# Patient Record
Sex: FEMALE | Race: BLACK OR AFRICAN AMERICAN | HISPANIC OR LATINO
[De-identification: names, ages, dates, MRNs, and addresses within clinical notes are randomized per-mention and may not be internally consistent; named-entity substitution may affect disease eponyms.]

---

## 2019-08-23 PROBLEM — Z00.00 ENCOUNTER FOR PREVENTIVE HEALTH EXAMINATION: Status: ACTIVE | Noted: 2019-08-23

## 2019-09-03 ENCOUNTER — APPOINTMENT (OUTPATIENT)
Dept: SURGERY | Facility: CLINIC | Age: 69
End: 2019-09-03
Payer: MEDICARE

## 2019-09-03 VITALS
HEART RATE: 63 BPM | WEIGHT: 179.19 LBS | TEMPERATURE: 97.8 F | HEIGHT: 55 IN | DIASTOLIC BLOOD PRESSURE: 102 MMHG | SYSTOLIC BLOOD PRESSURE: 194 MMHG | OXYGEN SATURATION: 97 % | BODY MASS INDEX: 41.47 KG/M2

## 2019-09-03 DIAGNOSIS — M19.90 UNSPECIFIED OSTEOARTHRITIS, UNSPECIFIED SITE: ICD-10-CM

## 2019-09-03 PROCEDURE — 99203 OFFICE O/P NEW LOW 30 MIN: CPT

## 2019-09-03 NOTE — END OF VISIT
[FreeTextEntry3] : All medical record entries made by the Scribe were at my, Dr. Bowen's direction and personally dictated by me on 09/03/2019  I have reviewed the chart and agree that the record accurately reflects my personal performance of the history, physical exam, assessment and plan. I have also personally directed, reviewed, and agreed with the chart.\par \par

## 2019-09-03 NOTE — HISTORY OF PRESENT ILLNESS
[de-identified] : 68 y/o F with BMI of 44, hx of HLD, PreDM, HTN, KARINE on CPAP, arthritis, Osteoporosis and thyroid disease and PSHx of 2x C-sections, Carpal tunnel surgery, foot surgery and percutaneous nephrolithotomy, presents here today for initial consultation of bariatric surgery. She states that she has attempted multiple diets and exercise regimen with no success. Patient is currently taking Atenolol, losartan, Calcium supplements, vitamin D, Levothyroxine, Atorvastatin, and receiving injections for osteoporosis every 6 months. Patient denies Tobacco or EtOH abuse. Currently not working.

## 2019-09-03 NOTE — ASSESSMENT
[FreeTextEntry1] : 68 y/o F with BMI of 44, hx of HLD, PreDM, HTN, KARINE on CPAP, arthritis, Osteoporosis and thyroid disease and PSHx of 2x C-sections, Carpal tunnel surgery, foot surgery and percutaneous nephrolithotomy, interested in bariatric surgery. Discuss with the patient on the available options in bariatric, including VSG vs Gastric bypass vs DS. Risk, benefits, and alternatives to the proposed procedure were discussed with the patient and all questions were answered to their satisfaction. Patient understands and will consider a procedure once she completes her work up. She will begin her work up process and follow up here as needed to help complete her work up.\par

## 2019-09-03 NOTE — ADDENDUM
[FreeTextEntry1] : Documented by Bee Herrera acting as a scribe for Dr. Mor Bowen on 09/03/2019\par

## 2019-09-17 ENCOUNTER — LABORATORY RESULT (OUTPATIENT)
Age: 69
End: 2019-09-17

## 2019-09-17 ENCOUNTER — APPOINTMENT (OUTPATIENT)
Dept: BARIATRICS | Facility: CLINIC | Age: 69
End: 2019-09-17

## 2019-09-17 VITALS — BODY MASS INDEX: 41.52 KG/M2 | WEIGHT: 179.38 LBS | HEIGHT: 55 IN

## 2019-09-26 DIAGNOSIS — A04.8 OTHER SPECIFIED BACTERIAL INTESTINAL INFECTIONS: ICD-10-CM

## 2019-09-26 RX ORDER — OMEPRAZOLE 20 MG/1
20 CAPSULE, DELAYED RELEASE ORAL TWICE DAILY
Qty: 28 | Refills: 2 | Status: ACTIVE | COMMUNITY
Start: 2019-09-26 | End: 1900-01-01

## 2019-09-26 RX ORDER — FLUCONAZOLE 150 MG/1
150 TABLET ORAL DAILY
Qty: 7 | Refills: 0 | Status: ACTIVE | COMMUNITY
Start: 2019-09-26 | End: 1900-01-01

## 2019-09-26 RX ORDER — AMOXICILLIN 500 MG/1
500 TABLET, FILM COATED ORAL TWICE DAILY
Qty: 56 | Refills: 0 | Status: ACTIVE | COMMUNITY
Start: 2019-09-26 | End: 1900-01-01

## 2019-09-26 RX ORDER — CLARITHROMYCIN 500 MG/1
500 TABLET, FILM COATED ORAL TWICE DAILY
Qty: 28 | Refills: 0 | Status: ACTIVE | COMMUNITY
Start: 2019-09-26 | End: 1900-01-01

## 2019-10-07 ENCOUNTER — APPOINTMENT (OUTPATIENT)
Dept: BARIATRICS | Facility: CLINIC | Age: 69
End: 2019-10-07
Payer: MEDICARE

## 2019-10-07 VITALS — WEIGHT: 174.38 LBS | HEIGHT: 55 IN | BODY MASS INDEX: 40.36 KG/M2

## 2019-10-07 PROCEDURE — 97802 MEDICAL NUTRITION INDIV IN: CPT

## 2019-10-10 ENCOUNTER — APPOINTMENT (OUTPATIENT)
Dept: ULTRASOUND IMAGING | Facility: HOSPITAL | Age: 69
End: 2019-10-10
Payer: MEDICARE

## 2019-10-10 ENCOUNTER — APPOINTMENT (OUTPATIENT)
Dept: RADIOLOGY | Facility: HOSPITAL | Age: 69
End: 2019-10-10
Payer: MEDICARE

## 2019-10-10 ENCOUNTER — OUTPATIENT (OUTPATIENT)
Dept: OUTPATIENT SERVICES | Facility: HOSPITAL | Age: 69
LOS: 1 days | End: 2019-10-10
Payer: MEDICARE

## 2019-10-10 PROCEDURE — 74241: CPT | Mod: 26

## 2019-10-10 PROCEDURE — 76700 US EXAM ABDOM COMPLETE: CPT | Mod: 26

## 2019-10-10 PROCEDURE — 71046 X-RAY EXAM CHEST 2 VIEWS: CPT | Mod: 26

## 2019-10-10 PROCEDURE — 76700 US EXAM ABDOM COMPLETE: CPT

## 2019-10-10 PROCEDURE — 71046 X-RAY EXAM CHEST 2 VIEWS: CPT

## 2019-10-10 PROCEDURE — 74241: CPT

## 2019-11-04 ENCOUNTER — LABORATORY RESULT (OUTPATIENT)
Age: 69
End: 2019-11-04

## 2019-11-04 VITALS — HEIGHT: 55 IN | WEIGHT: 176.25 LBS | BODY MASS INDEX: 40.79 KG/M2

## 2019-12-05 ENCOUNTER — APPOINTMENT (OUTPATIENT)
Dept: PULMONOLOGY | Facility: CLINIC | Age: 69
End: 2019-12-05
Payer: MEDICARE

## 2019-12-05 VITALS — BODY MASS INDEX: 40.38 KG/M2 | HEIGHT: 55 IN | WEIGHT: 174.5 LBS

## 2019-12-05 VITALS
DIASTOLIC BLOOD PRESSURE: 86 MMHG | OXYGEN SATURATION: 97 % | HEART RATE: 80 BPM | HEIGHT: 55 IN | SYSTOLIC BLOOD PRESSURE: 136 MMHG | TEMPERATURE: 98.3 F

## 2019-12-05 DIAGNOSIS — Z77.22 CONTACT WITH AND (SUSPECTED) EXPOSURE TO ENVIRONMENTAL TOBACCO SMOKE (ACUTE) (CHRONIC): ICD-10-CM

## 2019-12-05 DIAGNOSIS — Z01.811 ENCOUNTER FOR PREPROCEDURAL RESPIRATORY EXAMINATION: ICD-10-CM

## 2019-12-05 DIAGNOSIS — Z78.9 OTHER SPECIFIED HEALTH STATUS: ICD-10-CM

## 2019-12-05 DIAGNOSIS — Z83.3 FAMILY HISTORY OF DIABETES MELLITUS: ICD-10-CM

## 2019-12-05 DIAGNOSIS — Z80.1 FAMILY HISTORY OF MALIGNANT NEOPLASM OF TRACHEA, BRONCHUS AND LUNG: ICD-10-CM

## 2019-12-05 PROCEDURE — 99204 OFFICE O/P NEW MOD 45 MIN: CPT | Mod: 25

## 2019-12-05 PROCEDURE — 94060 EVALUATION OF WHEEZING: CPT

## 2019-12-05 PROCEDURE — 94727 GAS DIL/WSHOT DETER LNG VOL: CPT

## 2019-12-05 PROCEDURE — 94729 DIFFUSING CAPACITY: CPT

## 2019-12-05 RX ORDER — ATORVASTATIN CALCIUM 20 MG/1
20 TABLET, FILM COATED ORAL
Refills: 3 | Status: ACTIVE | COMMUNITY
Start: 2019-12-05

## 2019-12-05 RX ORDER — DENOSUMAB 60 MG/ML
60 INJECTION SUBCUTANEOUS
Refills: 0 | Status: ACTIVE | COMMUNITY
Start: 2019-12-05

## 2019-12-05 RX ORDER — LOSARTAN POTASSIUM 100 MG/1
100 TABLET, FILM COATED ORAL DAILY
Qty: 90 | Refills: 0 | Status: ACTIVE | COMMUNITY
Start: 2019-12-05

## 2019-12-05 RX ORDER — ATENOLOL 25 MG/1
25 TABLET ORAL DAILY
Refills: 0 | Status: ACTIVE | COMMUNITY
Start: 2019-12-05

## 2019-12-05 NOTE — HISTORY OF PRESENT ILLNESS
[Difficulty Breathing During Exertion] : denies dyspnea on exertion [Feelings Of Weakness On Exertion] : denies exercise intolerance [Cough] : denies coughing [Regional Soft Tissue Swelling Both Lower Extremities] : denies lower extremity edema [Fever] : denies fever [Wheezing] : denies wheezing [Chest Pain Or Discomfort] : denies chest pain [Wt Gain ___ Lbs] : recent [unfilled] ~Upound(s) weight gain [0  -  Nothing at all] : 0, nothing at all [Oxygen] : the patient uses no supplemental oxygen [FreeTextEntry1] :  number [id number] used during the visit.    \par \par 69 yr old female with PMH of KARINE with CPAP, HTN, HLD, pre DM and osteoporosis.  \par \par She has been using a CPAP machine in March 2019 (CPAP pressure 11 cm h20 with resmed air fit small nasal mask).  She is compliant with machine 4-6 hours a night.  She feels her day fatigue and sleeping has improved.  Denies snoring with mask on or walking up SOB.\par \par Denies SOB, cough, wheezing, edema, chest pain, palpitations.  \par \par

## 2019-12-05 NOTE — ASSESSMENT
[FreeTextEntry1] : ANDRZEJ BOJORQUEZ  is optimized for surgery. she  is to be extubated once fully awake and able to protect airway.  The patient is to be monitored in the recovery room. They might benefit for high flow oxygen or noninvasive ventilation to prevent or reverse atelectasis.  Patient is to be admitted to a monitored bed postoperatively.  Avoid oversedation.  ANDRZEJ is high risk for DVT and will require bimodal agents for DVT prophylaxis early mobilization is recommended. she is to use the incentive spirometry postoperative. \par \par - Reviewed CXR clear\par - PFT today normal spirometry without significant response to bronchodilator, normal TLC and normal DLCO.\par \par KARINE\par \par ANDRZEJ is compliant with the CPAP machine and tolerating.  I discussed the short and long term health effect of the obstructive seep apnea with the patient. These effects include, but not limited to, uncontrolled hypertension, CAD, arrhythmias, sudden death, CVA, and pulmonary hypertension. Continue to use the CPAP at least 4 plus hours nightly.  She is to use post op and follow with repeat sleep study post Surg / weight loss.  \par \par \par \par

## 2019-12-05 NOTE — PHYSICAL EXAM
[General Appearance - Well Developed] : well developed [Normal Appearance] : normal appearance [Well Groomed] : well groomed [General Appearance - Well Nourished] : well nourished [No Deformities] : no deformities [General Appearance - In No Acute Distress] : no acute distress [Normal Conjunctiva] : the conjunctiva exhibited no abnormalities [Neck Appearance] : the appearance of the neck was normal [Normal Oropharynx] : normal oropharynx [Eyelids - No Xanthelasma] : the eyelids demonstrated no xanthelasmas [Thyroid Diffuse Enlargement] : the thyroid was not enlarged [Jugular Venous Distention Increased] : there was no jugular-venous distention [Thyroid Nodule] : there were no palpable thyroid nodules [Neck Cervical Mass (___cm)] : no neck mass was observed [Heart Rate And Rhythm] : heart rate and rhythm were normal [Heart Sounds] : normal S1 and S2 [Murmurs] : no murmurs present [Respiration, Rhythm And Depth] : normal respiratory rhythm and effort [Auscultation Breath Sounds / Voice Sounds] : lungs were clear to auscultation bilaterally [Gait - Sufficient For Exercise Testing] : the gait was sufficient for exercise testing [Abnormal Walk] : normal gait [Exaggerated Use Of Accessory Muscles For Inspiration] : no accessory muscle use [Skin Turgor] : normal skin turgor [Skin Color & Pigmentation] : normal skin color and pigmentation [] : no rash

## 2019-12-16 ENCOUNTER — APPOINTMENT (OUTPATIENT)
Dept: BARIATRICS | Facility: CLINIC | Age: 69
End: 2019-12-16
Payer: MEDICARE

## 2019-12-16 VITALS — HEIGHT: 55 IN | BODY MASS INDEX: 40.07 KG/M2 | WEIGHT: 173.13 LBS

## 2019-12-16 DIAGNOSIS — E11.69 TYPE 2 DIABETES MELLITUS WITH OTHER SPECIFIED COMPLICATION: ICD-10-CM

## 2019-12-16 DIAGNOSIS — E78.2 TYPE 2 DIABETES MELLITUS WITH OTHER SPECIFIED COMPLICATION: ICD-10-CM

## 2019-12-16 PROCEDURE — 97803 MED NUTRITION INDIV SUBSEQ: CPT

## 2020-01-07 VITALS — WEIGHT: 173.13 LBS | HEIGHT: 55 IN | BODY MASS INDEX: 40.07 KG/M2

## 2020-02-04 ENCOUNTER — APPOINTMENT (OUTPATIENT)
Dept: SURGERY | Facility: CLINIC | Age: 70
End: 2020-02-04
Payer: MEDICARE

## 2020-02-04 VITALS
HEART RATE: 70 BPM | OXYGEN SATURATION: 97 % | DIASTOLIC BLOOD PRESSURE: 75 MMHG | WEIGHT: 171.25 LBS | TEMPERATURE: 97.5 F | HEIGHT: 55 IN | BODY MASS INDEX: 39.63 KG/M2 | SYSTOLIC BLOOD PRESSURE: 161 MMHG

## 2020-02-04 DIAGNOSIS — G47.33 OBSTRUCTIVE SLEEP APNEA (ADULT) (PEDIATRIC): ICD-10-CM

## 2020-02-04 DIAGNOSIS — R73.03 PREDIABETES.: ICD-10-CM

## 2020-02-04 PROCEDURE — 99214 OFFICE O/P EST MOD 30 MIN: CPT

## 2020-02-20 NOTE — HISTORY OF PRESENT ILLNESS
[de-identified] : 68 y/o F with BMI of 42, hx of HLD, PreDM, HTN, KARINE on CPAP, arthritis, Osteoporosis and thyroid disease and PSHx of 2x C-sections, Carpal tunnel surgery, foot surgery and percutaneous nephrolithotomy, presents here today for final bariatric consult. Patient completed all bariatric work up including 6 month weigh-ins and she is interested in a VSG operation. Patient was treated for h.pylori and her last test was negative. cardiologist cleared pt.

## 2020-02-20 NOTE — END OF VISIT
[FreeTextEntry3] : All medical record entries made by the Scribe were at my, Dr. Bowen's direction and personally dictated by me on 02/04/2020  I have reviewed the chart and agree that the record accurately reflects my personal performance of the history, physical exam, assessment and plan. I have also personally directed, reviewed, and agreed with the chart.\par

## 2020-02-20 NOTE — PLAN
[FreeTextEntry1] : See PCP for thyroid med adjustment and obtain medical clearance\par Schedule for VSG \par \par Patient completed all bariatric work-up including 6 month weigh-ins. PCP Medical clearance obtained. \par Scheduled for sleeve on 3/12/20

## 2020-02-20 NOTE — ASSESSMENT
[FreeTextEntry1] : 68 y/o F with BMI of 42, hx of HLD, PreDM, HTN, KARINE on CPAP, arthritis, Osteoporosis and thyroid disease and PSHx of 2x C-sections, Carpal tunnel surgery, foot surgery and percutaneous nephrolithotomy, presents here today for final bariatric consult. I discussed all pre-operative testing and imaging with the patient. I reviewed patient's blood work which demonstrated elevated TSH levels so I advised her to speak to her PCP, Dr. Blunt, about medication adjustment. I also reviewed patient's abdominal US 10/10/19 which demonstrates a complex kidney cyst which patient reports is known about and evaluated yearly with imaging by Dr. Blunt. I will refer patient to a cardiologist for cardiac clearance prior to the operation. Will schedule VSG operation pending thyroid medication adjustment and cardiac evaluation.

## 2020-02-27 ENCOUNTER — APPOINTMENT (OUTPATIENT)
Dept: HEART AND VASCULAR | Facility: CLINIC | Age: 70
End: 2020-02-27
Payer: MEDICARE

## 2020-02-27 ENCOUNTER — APPOINTMENT (OUTPATIENT)
Dept: BARIATRICS | Facility: CLINIC | Age: 70
End: 2020-02-27

## 2020-02-27 VITALS — HEIGHT: 55 IN | BODY MASS INDEX: 39.4 KG/M2 | WEIGHT: 170.25 LBS

## 2020-02-27 VITALS
OXYGEN SATURATION: 95 % | TEMPERATURE: 98 F | HEART RATE: 65 BPM | RESPIRATION RATE: 14 BRPM | BODY MASS INDEX: 39.34 KG/M2 | HEIGHT: 55 IN | WEIGHT: 170 LBS

## 2020-02-27 DIAGNOSIS — Z01.810 ENCOUNTER FOR PREPROCEDURAL CARDIOVASCULAR EXAMINATION: ICD-10-CM

## 2020-02-27 DIAGNOSIS — E66.01 MORBID (SEVERE) OBESITY DUE TO EXCESS CALORIES: ICD-10-CM

## 2020-02-27 DIAGNOSIS — I10 ESSENTIAL (PRIMARY) HYPERTENSION: ICD-10-CM

## 2020-02-27 PROCEDURE — 93000 ELECTROCARDIOGRAM COMPLETE: CPT

## 2020-02-27 PROCEDURE — 99204 OFFICE O/P NEW MOD 45 MIN: CPT

## 2020-02-27 RX ORDER — LEVOTHYROXINE SODIUM 0.03 MG/1
25 TABLET ORAL
Qty: 18 | Refills: 0 | Status: ACTIVE | COMMUNITY
Start: 2019-09-24

## 2020-02-27 RX ORDER — HYDROCHLOROTHIAZIDE 25 MG/1
25 TABLET ORAL
Qty: 90 | Refills: 0 | Status: ACTIVE | COMMUNITY
Start: 2020-02-17

## 2020-02-27 RX ORDER — ERGOCALCIFEROL 1.25 MG/1
1.25 MG CAPSULE, LIQUID FILLED ORAL
Qty: 4 | Refills: 0 | Status: ACTIVE | COMMUNITY
Start: 2019-12-03

## 2020-02-27 NOTE — HISTORY OF PRESENT ILLNESS
[FreeTextEntry1] : for cardiac evaluation prior to bariatric surgery\par good functional capacity\par lexiscan 11/19 at [facility] without ischemia\par echo 11/19 with normal LVEF, no significant valve disease\par no new interm cardiac symptoms

## 2020-02-27 NOTE — PHYSICAL EXAM
[Normal Appearance] : normal appearance [General Appearance - Well Developed] : well developed [General Appearance - Well Nourished] : well nourished [Well Groomed] : well groomed [No Deformities] : no deformities [Normal Conjunctiva] : the conjunctiva exhibited no abnormalities [General Appearance - In No Acute Distress] : no acute distress [Normal Oral Mucosa] : normal oral mucosa [No Oral Pallor] : no oral pallor [Eyelids - No Xanthelasma] : the eyelids demonstrated no xanthelasmas [No Oral Cyanosis] : no oral cyanosis [Normal Jugular Venous A Waves Present] : normal jugular venous A waves present [Normal Jugular Venous V Waves Present] : normal jugular venous V waves present [No Jugular Venous Anguiano A Waves] : no jugular venous anguiano A waves [Auscultation Breath Sounds / Voice Sounds] : lungs were clear to auscultation bilaterally [Exaggerated Use Of Accessory Muscles For Inspiration] : no accessory muscle use [Respiration, Rhythm And Depth] : normal respiratory rhythm and effort [Heart Rate And Rhythm] : heart rate and rhythm were normal [Heart Sounds] : normal S1 and S2 [Murmurs] : no murmurs present [Abdomen Soft] : soft [Abdomen Tenderness] : non-tender [Abdomen Mass (___ Cm)] : no abdominal mass palpated [Cyanosis, Localized] : no localized cyanosis [Gait - Sufficient For Exercise Testing] : the gait was sufficient for exercise testing [Nail Clubbing] : no clubbing of the fingernails [] : no ischemic changes [Oriented To Time, Place, And Person] : oriented to person, place, and time [Petechial Hemorrhages (___cm)] : no petechial hemorrhages [Mood] : the mood was normal [Affect] : the affect was normal [No Anxiety] : not feeling anxious

## 2020-02-27 NOTE — REVIEW OF SYSTEMS
[Joint Pain] : joint pain [see HPI] : see HPI [Joint Stiffness] : joint stiffness [Negative] : Heme/Lymph

## 2020-02-27 NOTE — DISCUSSION/SUMMARY
[FreeTextEntry1] : stable cardiac exam, recent negative non invasive cardiac evaluation, no cardiac contra indication to planned procedure

## 2020-06-01 ENCOUNTER — OUTPATIENT (OUTPATIENT)
Dept: OUTPATIENT SERVICES | Facility: HOSPITAL | Age: 70
LOS: 1 days | End: 2020-06-01
Payer: MEDICARE

## 2020-06-01 DIAGNOSIS — Z98.890 OTHER SPECIFIED POSTPROCEDURAL STATES: Chronic | ICD-10-CM

## 2020-06-01 DIAGNOSIS — Z98.891 HISTORY OF UTERINE SCAR FROM PREVIOUS SURGERY: Chronic | ICD-10-CM

## 2020-06-01 DIAGNOSIS — Z98.51 TUBAL LIGATION STATUS: Chronic | ICD-10-CM

## 2020-06-03 ENCOUNTER — OUTPATIENT (OUTPATIENT)
Dept: OUTPATIENT SERVICES | Facility: HOSPITAL | Age: 70
LOS: 1 days | End: 2020-06-03
Payer: MEDICARE

## 2020-06-03 DIAGNOSIS — Z98.890 OTHER SPECIFIED POSTPROCEDURAL STATES: Chronic | ICD-10-CM

## 2020-06-03 DIAGNOSIS — Z98.891 HISTORY OF UTERINE SCAR FROM PREVIOUS SURGERY: Chronic | ICD-10-CM

## 2020-06-03 DIAGNOSIS — Z01.818 ENCOUNTER FOR OTHER PREPROCEDURAL EXAMINATION: ICD-10-CM

## 2020-06-03 DIAGNOSIS — Z98.51 TUBAL LIGATION STATUS: Chronic | ICD-10-CM

## 2020-06-03 PROBLEM — I10 ESSENTIAL (PRIMARY) HYPERTENSION: Chronic | Status: ACTIVE | Noted: 2020-03-11

## 2020-06-03 PROBLEM — G47.30 SLEEP APNEA, UNSPECIFIED: Chronic | Status: ACTIVE | Noted: 2020-03-11

## 2020-06-03 PROBLEM — E78.5 HYPERLIPIDEMIA, UNSPECIFIED: Chronic | Status: ACTIVE | Noted: 2020-03-11

## 2020-06-03 PROBLEM — E03.9 HYPOTHYROIDISM, UNSPECIFIED: Chronic | Status: ACTIVE | Noted: 2020-03-11

## 2020-06-03 PROBLEM — E66.01 MORBID (SEVERE) OBESITY DUE TO EXCESS CALORIES: Chronic | Status: ACTIVE | Noted: 2020-03-11

## 2020-06-03 PROBLEM — M81.0 AGE-RELATED OSTEOPOROSIS WITHOUT CURRENT PATHOLOGICAL FRACTURE: Chronic | Status: ACTIVE | Noted: 2020-03-11

## 2020-06-03 PROBLEM — N20.0 CALCULUS OF KIDNEY: Chronic | Status: ACTIVE | Noted: 2020-03-11

## 2020-06-03 LAB
ALBUMIN SERPL ELPH-MCNC: 4.7 G/DL — SIGNIFICANT CHANGE UP (ref 3.3–5)
ALP SERPL-CCNC: 58 U/L — SIGNIFICANT CHANGE UP (ref 40–120)
ALT FLD-CCNC: 18 U/L — SIGNIFICANT CHANGE UP (ref 10–45)
ANION GAP SERPL CALC-SCNC: 13 MMOL/L — SIGNIFICANT CHANGE UP (ref 5–17)
APPEARANCE UR: CLEAR — SIGNIFICANT CHANGE UP
APTT BLD: 31.8 SEC — SIGNIFICANT CHANGE UP (ref 27.5–36.3)
AST SERPL-CCNC: 32 U/L — SIGNIFICANT CHANGE UP (ref 10–40)
BACTERIA # UR AUTO: PRESENT /HPF
BASOPHILS # BLD AUTO: 0.04 K/UL — SIGNIFICANT CHANGE UP (ref 0–0.2)
BASOPHILS NFR BLD AUTO: 0.6 % — SIGNIFICANT CHANGE UP (ref 0–2)
BILIRUB SERPL-MCNC: 1.6 MG/DL — HIGH (ref 0.2–1.2)
BILIRUB UR-MCNC: NEGATIVE — SIGNIFICANT CHANGE UP
BUN SERPL-MCNC: 14 MG/DL — SIGNIFICANT CHANGE UP (ref 7–23)
CALCIUM SERPL-MCNC: 10.2 MG/DL — SIGNIFICANT CHANGE UP (ref 8.4–10.5)
CHLORIDE SERPL-SCNC: 99 MMOL/L — SIGNIFICANT CHANGE UP (ref 96–108)
CO2 SERPL-SCNC: 33 MMOL/L — HIGH (ref 22–31)
COLOR SPEC: YELLOW — SIGNIFICANT CHANGE UP
CREAT SERPL-MCNC: 0.66 MG/DL — SIGNIFICANT CHANGE UP (ref 0.5–1.3)
DIFF PNL FLD: ABNORMAL
EOSINOPHIL # BLD AUTO: 0.13 K/UL — SIGNIFICANT CHANGE UP (ref 0–0.5)
EOSINOPHIL NFR BLD AUTO: 1.9 % — SIGNIFICANT CHANGE UP (ref 0–6)
EPI CELLS # UR: SIGNIFICANT CHANGE UP /HPF (ref 0–5)
GLUCOSE SERPL-MCNC: 113 MG/DL — HIGH (ref 70–99)
GLUCOSE UR QL: NEGATIVE — SIGNIFICANT CHANGE UP
HCT VFR BLD CALC: 44.2 % — SIGNIFICANT CHANGE UP (ref 34.5–45)
HGB BLD-MCNC: 14.2 G/DL — SIGNIFICANT CHANGE UP (ref 11.5–15.5)
IMM GRANULOCYTES NFR BLD AUTO: 0.1 % — SIGNIFICANT CHANGE UP (ref 0–1.5)
INR BLD: 1.18 — HIGH (ref 0.88–1.16)
KETONES UR-MCNC: NEGATIVE — SIGNIFICANT CHANGE UP
LEUKOCYTE ESTERASE UR-ACNC: ABNORMAL
LYMPHOCYTES # BLD AUTO: 2.24 K/UL — SIGNIFICANT CHANGE UP (ref 1–3.3)
LYMPHOCYTES # BLD AUTO: 32.6 % — SIGNIFICANT CHANGE UP (ref 13–44)
MCHC RBC-ENTMCNC: 31.9 PG — SIGNIFICANT CHANGE UP (ref 27–34)
MCHC RBC-ENTMCNC: 32.1 GM/DL — SIGNIFICANT CHANGE UP (ref 32–36)
MCV RBC AUTO: 99.3 FL — SIGNIFICANT CHANGE UP (ref 80–100)
MONOCYTES # BLD AUTO: 0.57 K/UL — SIGNIFICANT CHANGE UP (ref 0–0.9)
MONOCYTES NFR BLD AUTO: 8.3 % — SIGNIFICANT CHANGE UP (ref 2–14)
NEUTROPHILS # BLD AUTO: 3.89 K/UL — SIGNIFICANT CHANGE UP (ref 1.8–7.4)
NEUTROPHILS NFR BLD AUTO: 56.5 % — SIGNIFICANT CHANGE UP (ref 43–77)
NITRITE UR-MCNC: NEGATIVE — SIGNIFICANT CHANGE UP
NRBC # BLD: 0 /100 WBCS — SIGNIFICANT CHANGE UP (ref 0–0)
PH UR: 7 — SIGNIFICANT CHANGE UP (ref 5–8)
PLATELET # BLD AUTO: 346 K/UL — SIGNIFICANT CHANGE UP (ref 150–400)
POTASSIUM SERPL-MCNC: 4.3 MMOL/L — SIGNIFICANT CHANGE UP (ref 3.5–5.3)
POTASSIUM SERPL-SCNC: 4.3 MMOL/L — SIGNIFICANT CHANGE UP (ref 3.5–5.3)
PROT SERPL-MCNC: 7.5 G/DL — SIGNIFICANT CHANGE UP (ref 6–8.3)
PROT UR-MCNC: ABNORMAL MG/DL
PROTHROM AB SERPL-ACNC: 13.5 SEC — HIGH (ref 10–12.9)
RBC # BLD: 4.45 M/UL — SIGNIFICANT CHANGE UP (ref 3.8–5.2)
RBC # FLD: 13.2 % — SIGNIFICANT CHANGE UP (ref 10.3–14.5)
RBC CASTS # UR COMP ASSIST: ABNORMAL /HPF
SODIUM SERPL-SCNC: 145 MMOL/L — SIGNIFICANT CHANGE UP (ref 135–145)
SP GR SPEC: <=1.005 — SIGNIFICANT CHANGE UP (ref 1–1.03)
UROBILINOGEN FLD QL: 0.2 E.U./DL — SIGNIFICANT CHANGE UP
WBC # BLD: 6.88 K/UL — SIGNIFICANT CHANGE UP (ref 3.8–10.5)
WBC # FLD AUTO: 6.88 K/UL — SIGNIFICANT CHANGE UP (ref 3.8–10.5)
WBC UR QL: ABNORMAL /HPF

## 2020-06-03 PROCEDURE — 85730 THROMBOPLASTIN TIME PARTIAL: CPT

## 2020-06-03 PROCEDURE — 85025 COMPLETE CBC W/AUTO DIFF WBC: CPT

## 2020-06-03 PROCEDURE — 93005 ELECTROCARDIOGRAM TRACING: CPT

## 2020-06-03 PROCEDURE — 80053 COMPREHEN METABOLIC PANEL: CPT

## 2020-06-03 PROCEDURE — 81001 URINALYSIS AUTO W/SCOPE: CPT

## 2020-06-03 PROCEDURE — 85610 PROTHROMBIN TIME: CPT

## 2020-06-03 PROCEDURE — 93010 ELECTROCARDIOGRAM REPORT: CPT

## 2020-06-04 ENCOUNTER — APPOINTMENT (OUTPATIENT)
Dept: BARIATRICS | Facility: CLINIC | Age: 70
End: 2020-06-04
Payer: MEDICARE

## 2020-06-04 PROCEDURE — 97803 MED NUTRITION INDIV SUBSEQ: CPT

## 2020-06-04 PROCEDURE — 98968 PH1 ASSMT&MGMT NQHP 21-30: CPT

## 2020-06-05 ENCOUNTER — LABORATORY RESULT (OUTPATIENT)
Age: 70
End: 2020-06-05

## 2020-06-07 ENCOUNTER — TRANSCRIPTION ENCOUNTER (OUTPATIENT)
Age: 70
End: 2020-06-07

## 2020-06-08 ENCOUNTER — INPATIENT (INPATIENT)
Facility: HOSPITAL | Age: 70
LOS: 1 days | Discharge: HOME | DRG: 620 | End: 2020-06-10
Attending: SURGERY | Admitting: SURGERY
Payer: MEDICARE

## 2020-06-08 ENCOUNTER — APPOINTMENT (OUTPATIENT)
Dept: SURGERY | Facility: HOSPITAL | Age: 70
End: 2020-06-08

## 2020-06-08 ENCOUNTER — RESULT REVIEW (OUTPATIENT)
Age: 70
End: 2020-06-08

## 2020-06-08 VITALS
DIASTOLIC BLOOD PRESSURE: 80 MMHG | TEMPERATURE: 98 F | HEART RATE: 61 BPM | HEIGHT: 55 IN | OXYGEN SATURATION: 98 % | WEIGHT: 167.77 LBS | RESPIRATION RATE: 16 BRPM | SYSTOLIC BLOOD PRESSURE: 148 MMHG

## 2020-06-08 DIAGNOSIS — Z98.51 TUBAL LIGATION STATUS: Chronic | ICD-10-CM

## 2020-06-08 DIAGNOSIS — Z98.890 OTHER SPECIFIED POSTPROCEDURAL STATES: Chronic | ICD-10-CM

## 2020-06-08 DIAGNOSIS — Z98.891 HISTORY OF UTERINE SCAR FROM PREVIOUS SURGERY: Chronic | ICD-10-CM

## 2020-06-08 LAB
GLUCOSE BLDC GLUCOMTR-MCNC: 135 MG/DL — HIGH (ref 70–99)
GLUCOSE BLDC GLUCOMTR-MCNC: 137 MG/DL — HIGH (ref 70–99)
HCT VFR BLD CALC: 38.8 % — SIGNIFICANT CHANGE UP (ref 34.5–45)
HGB BLD-MCNC: 12.5 G/DL — SIGNIFICANT CHANGE UP (ref 11.5–15.5)
MCHC RBC-ENTMCNC: 32 PG — SIGNIFICANT CHANGE UP (ref 27–34)
MCHC RBC-ENTMCNC: 32.2 GM/DL — SIGNIFICANT CHANGE UP (ref 32–36)
MCV RBC AUTO: 99.2 FL — SIGNIFICANT CHANGE UP (ref 80–100)
NRBC # BLD: 0 /100 WBCS — SIGNIFICANT CHANGE UP (ref 0–0)
PLATELET # BLD AUTO: 315 K/UL — SIGNIFICANT CHANGE UP (ref 150–400)
RBC # BLD: 3.91 M/UL — SIGNIFICANT CHANGE UP (ref 3.8–5.2)
RBC # FLD: 13.5 % — SIGNIFICANT CHANGE UP (ref 10.3–14.5)
WBC # BLD: 11.03 K/UL — HIGH (ref 3.8–10.5)
WBC # FLD AUTO: 11.03 K/UL — HIGH (ref 3.8–10.5)

## 2020-06-08 PROCEDURE — 88341 IMHCHEM/IMCYTCHM EA ADD ANTB: CPT | Mod: 26

## 2020-06-08 PROCEDURE — 88307 TISSUE EXAM BY PATHOLOGIST: CPT | Mod: 26

## 2020-06-08 PROCEDURE — 43775 LAP SLEEVE GASTRECTOMY: CPT | Mod: GC

## 2020-06-08 PROCEDURE — 88342 IMHCHEM/IMCYTCHM 1ST ANTB: CPT | Mod: 26

## 2020-06-08 PROCEDURE — 88311 DECALCIFY TISSUE: CPT | Mod: 26

## 2020-06-08 RX ORDER — ACETAMINOPHEN 500 MG
1000 TABLET ORAL ONCE
Refills: 0 | Status: DISCONTINUED | OUTPATIENT
Start: 2020-06-08 | End: 2020-06-08

## 2020-06-08 RX ORDER — HYDROMORPHONE HYDROCHLORIDE 2 MG/ML
0.5 INJECTION INTRAMUSCULAR; INTRAVENOUS; SUBCUTANEOUS
Refills: 0 | Status: DISCONTINUED | OUTPATIENT
Start: 2020-06-08 | End: 2020-06-08

## 2020-06-08 RX ORDER — ACETAMINOPHEN 500 MG
1000 TABLET ORAL ONCE
Refills: 0 | Status: COMPLETED | OUTPATIENT
Start: 2020-06-08 | End: 2020-06-08

## 2020-06-08 RX ORDER — ONDANSETRON 8 MG/1
4 TABLET, FILM COATED ORAL EVERY 6 HOURS
Refills: 0 | Status: DISCONTINUED | OUTPATIENT
Start: 2020-06-08 | End: 2020-06-10

## 2020-06-08 RX ORDER — ENOXAPARIN SODIUM 100 MG/ML
60 INJECTION SUBCUTANEOUS EVERY 12 HOURS
Refills: 0 | Status: DISCONTINUED | OUTPATIENT
Start: 2020-06-08 | End: 2020-06-10

## 2020-06-08 RX ORDER — DEXTROSE 50 % IN WATER 50 %
25 SYRINGE (ML) INTRAVENOUS ONCE
Refills: 0 | Status: DISCONTINUED | OUTPATIENT
Start: 2020-06-08 | End: 2020-06-10

## 2020-06-08 RX ORDER — SODIUM CHLORIDE 9 MG/ML
1000 INJECTION, SOLUTION INTRAVENOUS
Refills: 0 | Status: DISCONTINUED | OUTPATIENT
Start: 2020-06-08 | End: 2020-06-09

## 2020-06-08 RX ORDER — GABAPENTIN 400 MG/1
300 CAPSULE ORAL ONCE
Refills: 0 | Status: COMPLETED | OUTPATIENT
Start: 2020-06-08 | End: 2020-06-08

## 2020-06-08 RX ORDER — ENOXAPARIN SODIUM 100 MG/ML
40 INJECTION SUBCUTANEOUS EVERY 12 HOURS
Refills: 0 | Status: DISCONTINUED | OUTPATIENT
Start: 2020-06-08 | End: 2020-06-08

## 2020-06-08 RX ORDER — ACETAMINOPHEN 500 MG
1000 TABLET ORAL EVERY 6 HOURS
Refills: 0 | Status: COMPLETED | OUTPATIENT
Start: 2020-06-08 | End: 2020-06-08

## 2020-06-08 RX ORDER — SCOPALAMINE 1 MG/3D
1 PATCH, EXTENDED RELEASE TRANSDERMAL ONCE
Refills: 0 | Status: COMPLETED | OUTPATIENT
Start: 2020-06-08 | End: 2020-06-08

## 2020-06-08 RX ORDER — PANTOPRAZOLE SODIUM 20 MG/1
40 TABLET, DELAYED RELEASE ORAL DAILY
Refills: 0 | Status: DISCONTINUED | OUTPATIENT
Start: 2020-06-08 | End: 2020-06-10

## 2020-06-08 RX ORDER — HYDRALAZINE HCL 50 MG
5 TABLET ORAL ONCE
Refills: 0 | Status: COMPLETED | OUTPATIENT
Start: 2020-06-08 | End: 2020-06-08

## 2020-06-08 RX ORDER — INSULIN LISPRO 100/ML
VIAL (ML) SUBCUTANEOUS
Refills: 0 | Status: DISCONTINUED | OUTPATIENT
Start: 2020-06-08 | End: 2020-06-10

## 2020-06-08 RX ORDER — ACETAMINOPHEN 500 MG
1000 TABLET ORAL ONCE
Refills: 0 | Status: COMPLETED | OUTPATIENT
Start: 2020-06-09 | End: 2020-06-09

## 2020-06-08 RX ORDER — SODIUM CHLORIDE 9 MG/ML
1000 INJECTION, SOLUTION INTRAVENOUS
Refills: 0 | Status: DISCONTINUED | OUTPATIENT
Start: 2020-06-08 | End: 2020-06-10

## 2020-06-08 RX ORDER — DEXTROSE 50 % IN WATER 50 %
15 SYRINGE (ML) INTRAVENOUS ONCE
Refills: 0 | Status: DISCONTINUED | OUTPATIENT
Start: 2020-06-08 | End: 2020-06-10

## 2020-06-08 RX ORDER — ENOXAPARIN SODIUM 100 MG/ML
40 INJECTION SUBCUTANEOUS ONCE
Refills: 0 | Status: COMPLETED | OUTPATIENT
Start: 2020-06-08 | End: 2020-06-08

## 2020-06-08 RX ORDER — LEVOTHYROXINE SODIUM 125 MCG
20 TABLET ORAL AT BEDTIME
Refills: 0 | Status: DISCONTINUED | OUTPATIENT
Start: 2020-06-08 | End: 2020-06-10

## 2020-06-08 RX ORDER — KETOROLAC TROMETHAMINE 30 MG/ML
15 SYRINGE (ML) INJECTION EVERY 6 HOURS
Refills: 0 | Status: DISCONTINUED | OUTPATIENT
Start: 2020-06-08 | End: 2020-06-08

## 2020-06-08 RX ORDER — KETOROLAC TROMETHAMINE 30 MG/ML
15 SYRINGE (ML) INJECTION EVERY 6 HOURS
Refills: 0 | Status: DISCONTINUED | OUTPATIENT
Start: 2020-06-08 | End: 2020-06-10

## 2020-06-08 RX ORDER — GLUCAGON INJECTION, SOLUTION 0.5 MG/.1ML
1 INJECTION, SOLUTION SUBCUTANEOUS ONCE
Refills: 0 | Status: DISCONTINUED | OUTPATIENT
Start: 2020-06-08 | End: 2020-06-10

## 2020-06-08 RX ORDER — DEXTROSE 50 % IN WATER 50 %
12.5 SYRINGE (ML) INTRAVENOUS ONCE
Refills: 0 | Status: DISCONTINUED | OUTPATIENT
Start: 2020-06-08 | End: 2020-06-10

## 2020-06-08 RX ADMIN — GABAPENTIN 300 MILLIGRAM(S): 400 CAPSULE ORAL at 09:48

## 2020-06-08 RX ADMIN — ENOXAPARIN SODIUM 60 MILLIGRAM(S): 100 INJECTION SUBCUTANEOUS at 21:52

## 2020-06-08 RX ADMIN — PANTOPRAZOLE SODIUM 40 MILLIGRAM(S): 20 TABLET, DELAYED RELEASE ORAL at 15:04

## 2020-06-08 RX ADMIN — Medication 15 MILLIGRAM(S): at 23:41

## 2020-06-08 RX ADMIN — Medication 20 MICROGRAM(S): at 21:52

## 2020-06-08 RX ADMIN — SCOPALAMINE 1 PATCH: 1 PATCH, EXTENDED RELEASE TRANSDERMAL at 09:48

## 2020-06-08 RX ADMIN — Medication 5 MILLIGRAM(S): at 14:34

## 2020-06-08 RX ADMIN — SCOPALAMINE 1 PATCH: 1 PATCH, EXTENDED RELEASE TRANSDERMAL at 18:01

## 2020-06-08 RX ADMIN — Medication 400 MILLIGRAM(S): at 15:05

## 2020-06-08 RX ADMIN — SODIUM CHLORIDE 150 MILLILITER(S): 9 INJECTION, SOLUTION INTRAVENOUS at 17:38

## 2020-06-08 RX ADMIN — Medication 15 MILLIGRAM(S): at 17:13

## 2020-06-08 RX ADMIN — Medication 5 MILLIGRAM(S): at 14:05

## 2020-06-08 RX ADMIN — Medication 1000 MILLIGRAM(S): at 09:48

## 2020-06-08 RX ADMIN — ENOXAPARIN SODIUM 40 MILLIGRAM(S): 100 INJECTION SUBCUTANEOUS at 09:48

## 2020-06-08 RX ADMIN — Medication 400 MILLIGRAM(S): at 21:52

## 2020-06-08 NOTE — H&P ADULT - HISTORY OF PRESENT ILLNESS
69F with PMH of HTN, HLD, PreDM, Morbid obesity (BMI 42), KARINE on CPAP, OA, thyroid disease, PSH of C-sections x2, carpal tunnel surgery, foot surgery, percutaneous nephrolithotomy admitted for elective LSG. Pt reports feeling well today, denies flu-like symptoms, COVID negative. 69F with PMH of HTN, HLD, PreDM, Morbid obesity (BMI 42), KARINE on CPAP, OA, hypothyroidism, PSH of C-sections x2, carpal tunnel surgery, foot surgery, percutaneous nephrolithotomy admitted for elective LSG. Pt reports feeling well today, denies flu-like symptoms, COVID negative. Pre-op Hb 14, Cr: 0.6. 69F with PMH of HTN, HLD, PreDM, Morbid obesity (BMI 51), KARINE on CPAP, OA, hypothyroidism, PSH of C-sections x2, carpal tunnel surgery, foot surgery, percutaneous nephrolithotomy admitted for elective LSG. Pt reports feeling well today, denies flu-like symptoms, COVID negative. Pre-op Hb 14, Cr: 0.6.

## 2020-06-08 NOTE — H&P ADULT - NSICDXPASTSURGICALHX_GEN_ALL_CORE_FT
PAST SURGICAL HISTORY:  H/O abdominoplasty     H/O foot surgery right x2    H/O tubal ligation     H/O:  section x2    History of surgery hand tenodesis and carpal tunnel    History of surgery right nephrostomy

## 2020-06-08 NOTE — PACU DISCHARGE NOTE - COMMENTS
pt had an uneventful stay in pacu VSS awake alert on a bed with 90 degree angle, breathing at 99% on room air has 4 derma bond lap sites to abd, gave report to Jed Stovall going to room 9613 via bed. no neuro deficit noted. I spoke with daughter Niesha and gave an up date. security was called to bring up her phone.

## 2020-06-08 NOTE — H&P ADULT - NSICDXPASTMEDICALHX_GEN_ALL_CORE_FT
PAST MEDICAL HISTORY:  HLD (hyperlipidemia)     HTN (hypertension)     Hypothyroid     Morbid obesity     Nephrolithiasis     Osteoporosis     Sleep apnea

## 2020-06-08 NOTE — PROGRESS NOTE ADULT - ASSESSMENT
69F with PMH of HTN, HLD, PreDM, Morbid obesity (BMI 51), KARINE on CPAP, OA, hypothyroidism, PSH of C-sections x2, carpal tunnel surgery, foot surgery, percutaneous nephrolithotomy s/p  lap sleeve gastrectomy 2cm solid mass on the superior greater 6/8  -Pain/nausea control  -NPO, IVF  -Protonix  -CBC 6hrs post-op   -Lovenox DVT ppx   -SCDs, OOB/A, IS  -AM labs  -Nutritional consult in AM

## 2020-06-08 NOTE — H&P ADULT - NSHPPHYSICALEXAM_GEN_ALL_CORE
ICU Vital Signs Last 24 Hrs  T(C): 36.4 (08 Jun 2020 08:37), Max: 36.4 (08 Jun 2020 08:37)  T(F): 97.6 (08 Jun 2020 08:37), Max: 97.6 (08 Jun 2020 08:37)  HR: 61 (08 Jun 2020 08:37) (61 - 61)  BP: 148/80 (08 Jun 2020 08:37) (148/80 - 148/80)  BP(mean): --  ABP: --  ABP(mean): --  RR: 16 (08 Jun 2020 08:37) (16 - 16)  SpO2: 98% (08 Jun 2020 08:37) (98% - 98%)    GEN: NAD, awake, eyes open spontaneously  HEENT: NCAT, MMM, Trachea midline, normal conjunctiva, perrl  CHEST/LUNGS: Nonlaboured breathing, CTAB, bilateral breath sounds  CARDIAC: RRR, no m/r/g  ABDOMEN: Soft, Nontender, Nondistended, No rebound/guarding  MSK: No oedema, no gross deformity of extremities  SKIN: No rashes, no petechiae, no vesicles  NEURO: CN grossly intact, normal coordination, no focal motor or sensory deficits  PSYCH: Alert, appropriate, cooperative, with capacity and insight

## 2020-06-08 NOTE — H&P ADULT - ASSESSMENT
69F with PMH of HTN, HLD, PreDM, Morbid obesity (BMI 42), KARINE on CPAP, OA, thyroid disease, PSH of C-sections x2, carpal tunnel surgery, foot surgery, percutaneous nephrolithotomy admitted for elective LSG.    Plan  NPO, IVF  OR for LSG  Pain control and zofran post-op  DVT PPX, HSQ, SCD 69F with PMH of HTN, HLD, PreDM, Morbid obesity (BMI 42), KARINE on CPAP, OA, hypothyroidism, PSH of C-sections x2, carpal tunnel surgery, foot surgery, percutaneous nephrolithotomy admitted for elective LSG.    Plan  NPO, IVF  OR for LSG  Pain control and zofran post-op  Post-op bariatric orders   DVT PPX, LVX, SCD

## 2020-06-08 NOTE — PROGRESS NOTE ADULT - SUBJECTIVE AND OBJECTIVE BOX
POST-OPERATIVE NOTE    Procedure: Laparoscopic sleeve gastrectomy     Diagnosis/Indication: Obesity     Surgeon: Dr. Mcclellan    S: Pt has no complaints. Denies CP, SOB, PRASAD, calf tenderness. Pain controlled with medication.    O:  T(C): 36.7 (06-08-20 @ 17:12), Max: 36.7 (06-08-20 @ 17:12)  T(F): 98 (06-08-20 @ 17:12), Max: 98 (06-08-20 @ 17:12)  HR: 71 (06-08-20 @ 17:12) (62 - 82)  BP: 120/66 (06-08-20 @ 17:12) (117/56 - 189/88)  RR: 17 (06-08-20 @ 17:12) (16 - 25)  SpO2: 96% (06-08-20 @ 17:12) (96% - 100%)  Wt(kg): --            Gen: NAD, resting comfortably in bed  C/V: NSR  Pulm: Nonlabored breathing, no respiratory distress  Abd: soft, non-distended , TTP around incision site , incision clean dry and intact  Extrem: WWP, no calf edema, SCDs in place

## 2020-06-08 NOTE — BRIEF OPERATIVE NOTE - OPERATION/FINDINGS
Veress entry. Stomach divided along 32Fr Bougie edge using 6 staple loads (45mm x 2, 60mm x 4). Hemostasis achieved. Fascia at 12mm post site closed with zero vicryl. Port sites x 4 closed w/ 4-0 Monocryl sutures & surgical glue. Veress needle leak test on stomach specimen negative for leak. Veress entry. 2cm solid mass on the superior greater curvature of the stomach identified. Stomach divided along 32Fr Bougie edge using 6 staple loads (45mm x 2, 60mm x 4). Hemostasis achieved. Fascia at 12mm post site closed with zero vicryl. Port sites x 4 closed w/ 4-0 Monocryl sutures & surgical glue. Veress needle leak test on stomach specimen negative for leak.

## 2020-06-09 ENCOUNTER — TRANSCRIPTION ENCOUNTER (OUTPATIENT)
Age: 70
End: 2020-06-09

## 2020-06-09 LAB
A1C WITH ESTIMATED AVERAGE GLUCOSE RESULT: 6 % — HIGH (ref 4–5.6)
ANION GAP SERPL CALC-SCNC: 16 MMOL/L — SIGNIFICANT CHANGE UP (ref 5–17)
BASOPHILS # BLD AUTO: 0.01 K/UL — SIGNIFICANT CHANGE UP (ref 0–0.2)
BASOPHILS NFR BLD AUTO: 0.1 % — SIGNIFICANT CHANGE UP (ref 0–2)
BUN SERPL-MCNC: 14 MG/DL — SIGNIFICANT CHANGE UP (ref 7–23)
CALCIUM SERPL-MCNC: 8.9 MG/DL — SIGNIFICANT CHANGE UP (ref 8.4–10.5)
CHLORIDE SERPL-SCNC: 98 MMOL/L — SIGNIFICANT CHANGE UP (ref 96–108)
CO2 SERPL-SCNC: 25 MMOL/L — SIGNIFICANT CHANGE UP (ref 22–31)
CREAT SERPL-MCNC: 0.72 MG/DL — SIGNIFICANT CHANGE UP (ref 0.5–1.3)
EOSINOPHIL # BLD AUTO: 0 K/UL — SIGNIFICANT CHANGE UP (ref 0–0.5)
EOSINOPHIL NFR BLD AUTO: 0 % — SIGNIFICANT CHANGE UP (ref 0–6)
ESTIMATED AVERAGE GLUCOSE: 126 MG/DL — HIGH (ref 68–114)
GLUCOSE BLDC GLUCOMTR-MCNC: 110 MG/DL — HIGH (ref 70–99)
GLUCOSE BLDC GLUCOMTR-MCNC: 116 MG/DL — HIGH (ref 70–99)
GLUCOSE BLDC GLUCOMTR-MCNC: 124 MG/DL — HIGH (ref 70–99)
GLUCOSE BLDC GLUCOMTR-MCNC: 126 MG/DL — HIGH (ref 70–99)
GLUCOSE SERPL-MCNC: 143 MG/DL — HIGH (ref 70–99)
HCT VFR BLD CALC: 39.1 % — SIGNIFICANT CHANGE UP (ref 34.5–45)
HCV AB S/CO SERPL IA: 0.07 S/CO — SIGNIFICANT CHANGE UP
HCV AB SERPL-IMP: SIGNIFICANT CHANGE UP
HGB BLD-MCNC: 12.8 G/DL — SIGNIFICANT CHANGE UP (ref 11.5–15.5)
IMM GRANULOCYTES NFR BLD AUTO: 0.2 % — SIGNIFICANT CHANGE UP (ref 0–1.5)
LYMPHOCYTES # BLD AUTO: 0.95 K/UL — LOW (ref 1–3.3)
LYMPHOCYTES # BLD AUTO: 10.1 % — LOW (ref 13–44)
MAGNESIUM SERPL-MCNC: 1.9 MG/DL — SIGNIFICANT CHANGE UP (ref 1.6–2.6)
MCHC RBC-ENTMCNC: 32.1 PG — SIGNIFICANT CHANGE UP (ref 27–34)
MCHC RBC-ENTMCNC: 32.7 GM/DL — SIGNIFICANT CHANGE UP (ref 32–36)
MCV RBC AUTO: 98 FL — SIGNIFICANT CHANGE UP (ref 80–100)
MONOCYTES # BLD AUTO: 0.74 K/UL — SIGNIFICANT CHANGE UP (ref 0–0.9)
MONOCYTES NFR BLD AUTO: 7.9 % — SIGNIFICANT CHANGE UP (ref 2–14)
NEUTROPHILS # BLD AUTO: 7.69 K/UL — HIGH (ref 1.8–7.4)
NEUTROPHILS NFR BLD AUTO: 81.7 % — HIGH (ref 43–77)
NRBC # BLD: 0 /100 WBCS — SIGNIFICANT CHANGE UP (ref 0–0)
PHOSPHATE SERPL-MCNC: 3 MG/DL — SIGNIFICANT CHANGE UP (ref 2.5–4.5)
PLATELET # BLD AUTO: 307 K/UL — SIGNIFICANT CHANGE UP (ref 150–400)
POTASSIUM SERPL-MCNC: 3.5 MMOL/L — SIGNIFICANT CHANGE UP (ref 3.5–5.3)
POTASSIUM SERPL-SCNC: 3.5 MMOL/L — SIGNIFICANT CHANGE UP (ref 3.5–5.3)
RBC # BLD: 3.99 M/UL — SIGNIFICANT CHANGE UP (ref 3.8–5.2)
RBC # FLD: 13.7 % — SIGNIFICANT CHANGE UP (ref 10.3–14.5)
SODIUM SERPL-SCNC: 139 MMOL/L — SIGNIFICANT CHANGE UP (ref 135–145)
WBC # BLD: 9.41 K/UL — SIGNIFICANT CHANGE UP (ref 3.8–10.5)
WBC # FLD AUTO: 9.41 K/UL — SIGNIFICANT CHANGE UP (ref 3.8–10.5)

## 2020-06-09 RX ORDER — POTASSIUM CHLORIDE 20 MEQ
10 PACKET (EA) ORAL
Refills: 0 | Status: COMPLETED | OUTPATIENT
Start: 2020-06-09 | End: 2020-06-09

## 2020-06-09 RX ORDER — ACETAMINOPHEN 500 MG
2 TABLET ORAL
Qty: 32 | Refills: 0
Start: 2020-06-09 | End: 2020-06-12

## 2020-06-09 RX ORDER — SODIUM CHLORIDE 9 MG/ML
1000 INJECTION, SOLUTION INTRAVENOUS
Refills: 0 | Status: DISCONTINUED | OUTPATIENT
Start: 2020-06-09 | End: 2020-06-10

## 2020-06-09 RX ORDER — LOSARTAN POTASSIUM 100 MG/1
1 TABLET, FILM COATED ORAL
Qty: 0 | Refills: 0 | DISCHARGE

## 2020-06-09 RX ADMIN — Medication 100 MILLIEQUIVALENT(S): at 16:04

## 2020-06-09 RX ADMIN — Medication 15 MILLIGRAM(S): at 17:22

## 2020-06-09 RX ADMIN — Medication 400 MILLIGRAM(S): at 03:12

## 2020-06-09 RX ADMIN — SODIUM CHLORIDE 75 MILLILITER(S): 9 INJECTION, SOLUTION INTRAVENOUS at 09:55

## 2020-06-09 RX ADMIN — Medication 100 MILLIEQUIVALENT(S): at 09:56

## 2020-06-09 RX ADMIN — ENOXAPARIN SODIUM 60 MILLIGRAM(S): 100 INJECTION SUBCUTANEOUS at 09:55

## 2020-06-09 RX ADMIN — Medication 15 MILLIGRAM(S): at 05:16

## 2020-06-09 RX ADMIN — Medication 15 MILLIGRAM(S): at 23:15

## 2020-06-09 RX ADMIN — Medication 100 MILLIEQUIVALENT(S): at 12:10

## 2020-06-09 RX ADMIN — PANTOPRAZOLE SODIUM 40 MILLIGRAM(S): 20 TABLET, DELAYED RELEASE ORAL at 11:26

## 2020-06-09 RX ADMIN — SCOPALAMINE 1 PATCH: 1 PATCH, EXTENDED RELEASE TRANSDERMAL at 05:15

## 2020-06-09 RX ADMIN — ENOXAPARIN SODIUM 60 MILLIGRAM(S): 100 INJECTION SUBCUTANEOUS at 23:15

## 2020-06-09 RX ADMIN — Medication 20 MICROGRAM(S): at 23:15

## 2020-06-09 RX ADMIN — SCOPALAMINE 1 PATCH: 1 PATCH, EXTENDED RELEASE TRANSDERMAL at 18:10

## 2020-06-09 RX ADMIN — Medication 15 MILLIGRAM(S): at 11:26

## 2020-06-09 NOTE — DISCHARGE NOTE PROVIDER - NSDCCPCAREPLAN_GEN_ALL_CORE_FT
PRINCIPAL DISCHARGE DIAGNOSIS  Diagnosis: Morbid obesity  Assessment and Plan of Treatment: 69F with PMH of HTN, HLD, PreDM, Morbid obesity (BMI 51), KARINE on CPAP, OA, hypothyroidism, PSH of C-sections x2, carpal tunnel surgery, foot surgery, percutaneous nephrolithotomy s/p lap sleeve gastrectomy 2cm solid mass on the superior greater. Mass sent to pathology. Pt tolerated the procedure well. At time of discharge, pt was tolerating a bariatric clear liquid diet, and pt's pain was controlled. Plan is to follow up with Dr. Bowen  in the office.

## 2020-06-09 NOTE — PROGRESS NOTE ADULT - SUBJECTIVE AND OBJECTIVE BOX
OVERNIGHT EVENTS: : pain controlled, encouraged pt to ambulate, and use IS, post op h/h: 12.5/38.8, failed TOV, bladder scan 600cc, straight cath 750cc   6/8: lap sleeve gastrectomy 2cm solid mass on the superior greater, POC WNL    STATUS POST:  lap sleeve gastrectomy 2cm solid mass on the superior greater    POST OPERATIVE DAY #: 1    SUBJECTIVE: Patient examined bedside with chief resident. Patient complains of incisional pain.  Patient reports she is ambulating and using incentive spirometer. Patient denies nausea, emesis SOB and chest pain. Patient making adequate urine.         enoxaparin Injectable 60 milliGRAM(s) SubCutaneous every 12 hours      Vital Signs Last 24 Hrs  T(C): 37.2 (09 Jun 2020 05:17), Max: 37.9 (09 Jun 2020 00:08)  T(F): 99 (09 Jun 2020 05:17), Max: 100.2 (09 Jun 2020 00:08)  HR: 63 (09 Jun 2020 05:17) (62 - 82)  BP: 118/69 (09 Jun 2020 05:17) (117/56 - 189/88)  BP(mean): 127 (08 Jun 2020 14:31) (125 - 127)  RR: 17 (09 Jun 2020 05:17) (16 - 25)  SpO2: 94% (09 Jun 2020 05:17) (94% - 100%)  I&O's Detail    08 Jun 2020 07:01  -  09 Jun 2020 07:00  --------------------------------------------------------  IN:    IV PiggyBack: 100 mL    lactated ringers.: 2100 mL  Total IN: 2200 mL    OUT:    Post-Void Residual per Intermittent Catheterization: 750 mL    Voided: 580 mL  Total OUT: 1330 mL    Total NET: 870 mL          General: NAD, resting comfortably in bed  C/V: NSR  Pulm: Nonlabored breathing, no respiratory distress  Abd: Soft , non-distended , TTP around incision site , incision clean dry and intact   Extrem: WWP, no edema, SCDs in place      LABS:                        12.8   9.41  )-----------( 307      ( 09 Jun 2020 06:16 )             39.1     06-09    139  |  98  |  14  ----------------------------<  143<H>  3.5   |  25  |  0.72    Ca    8.9      09 Jun 2020 06:16

## 2020-06-09 NOTE — PROGRESS NOTE ADULT - ASSESSMENT
69F with PMH of HTN, HLD, PreDM, Morbid obesity (BMI 51), KARINE on CPAP, OA, hypothyroidism, PSH of C-sections x2, carpal tunnel surgery, foot surgery, percutaneous nephrolithotomy s/p  lap sleeve gastrectomy 2cm solid mass on the superior greater 6/8  -Pain/nausea control  -BCLD, IVF  -Protonix  -Lovenox DVT ppx   -SCDs, OOB/A, IS  -AM labs  -Nutritional consult in AM

## 2020-06-09 NOTE — DISCHARGE NOTE PROVIDER - NSDCMRMEDTOKEN_GEN_ALL_CORE_FT
atenolol 25 mg oral tablet: 1 tab(s) orally once a day  atorvastatin 20 mg oral tablet: orally once a day (at bedtime)  cholecalciferol 50,000 intl units (1250 mcg) oral capsule: 1 cap(s) orally once a week  Citracal + D 250 mg-500 intl units (12.5 mcg) oral tablet, chewable: 2 tab(s) orally 2 times a day (with meals)  hydroCHLOROthiazide 25 mg oral tablet: 1 tab(s) orally once a day  levothyroxine 25 mcg (0.025 mg) oral tablet: 1 tab(s) orally once a day  Prolia 60 mg/mL subcutaneous solution: subcutaneous every 6 months  Restasis 0.05% ophthalmic emulsion: to each affected eye once a day  Tylenol Extra Strength 500 mg oral tablet: 2 tab(s) orally every 6 hours, As Needed -for moderate pain - for severe pain MDD:8 atenolol 25 mg oral tablet: 1 tab(s) orally once a day  atorvastatin 20 mg oral tablet: orally once a day (at bedtime)  cholecalciferol 50,000 intl units (1250 mcg) oral capsule: 1 cap(s) orally once a week  Citracal + D 250 mg-500 intl units (12.5 mcg) oral tablet, chewable: 2 tab(s) orally 2 times a day (with meals)  levothyroxine 25 mcg (0.025 mg) oral tablet: 1 tab(s) orally once a day  Prolia 60 mg/mL subcutaneous solution: subcutaneous every 6 months  Restasis 0.05% ophthalmic emulsion: to each affected eye once a day  Tylenol Extra Strength 500 mg oral tablet: 2 tab(s) orally every 6 hours, As Needed -for moderate pain - for severe pain MDD:8 atenolol 25 mg oral tablet: 1 tab(s) orally once a day  atorvastatin 20 mg oral tablet: orally once a day (at bedtime)  cholecalciferol 50,000 intl units (1250 mcg) oral capsule: 1 cap(s) orally once a week  Citracal + D 250 mg-500 intl units (12.5 mcg) oral tablet, chewable: 2 tab(s) orally 2 times a day (with meals)  levothyroxine 25 mcg (0.025 mg) oral tablet: 1 tab(s) orally once a day  Prolia 60 mg/mL subcutaneous solution: subcutaneous every 6 months  Protonix 40 mg oral delayed release tablet: 1 tab(s) orally once a day MDD:1 tablet  Restasis 0.05% ophthalmic emulsion: to each affected eye once a day  Tylenol Extra Strength 500 mg oral tablet: 2 tab(s) orally every 6 hours, As Needed -for moderate pain - for severe pain MDD:8

## 2020-06-09 NOTE — DISCHARGE NOTE PROVIDER - HOSPITAL COURSE
69F with PMH of HTN, HLD, PreDM, Morbid obesity (BMI 51), KARINE on CPAP, OA, hypothyroidism, PSH of C-sections x2, carpal tunnel surgery, foot surgery, percutaneous nephrolithotomy s/p lap sleeve gastrectomy 2cm solid mass on the superior greater. Mass sent to pathology. Pt tolerated the procedure well. At time of discharge, pt was tolerating a bariatric clear liquid diet, and pt's pain was controlled. Plan is to follow up with Dr. Bowen  in the office.

## 2020-06-09 NOTE — DIETITIAN INITIAL EVALUATION ADULT. - ADD RECOMMEND
1. Trend wts 2. Manage pain prn 3. Encourage intake through day/ hydration 4. Add MVI and ericka supplements if remains admitted

## 2020-06-09 NOTE — DISCHARGE NOTE PROVIDER - CARE PROVIDER_API CALL
Mor Bowen A  SURGERY  [address] [id number]  Phone: [phone]  Fax: [phone]  Follow Up Time: 1 week

## 2020-06-09 NOTE — DIETITIAN INITIAL EVALUATION ADULT. - OTHER INFO
69F with PMH of HTN, HLD, PreDM (A1C 6.0), Morbid obesity (BMI 51), KARINE on CPAP, OA, hypothyroidism, PSH of C-sections x2, carpal tunnel surgery, foot surgery, percutaneous nephrolithotomy s/p  lap sleeve gastrectomy 2cm solid mass on the superior greater 6/8. Observed pt at edge of bed noting she consumed x3 oz of liquids so far, moved bowels and is feeling well. Denies n/v/d/c, pain being managed, skin with lap sites, no chewing/ swallowing issues. Notes allergy to pepper, grapefruit. No wt changes, confirms ht 54" not 48". Discussed diet progression with pt, notes has supplements at home set up for DC. Wanting to know when she will be DC. Will follow per protocol.

## 2020-06-09 NOTE — CHART NOTE - NSCHARTNOTEFT_GEN_A_CORE
Upon Nutritional Assessment by the Registered Dietitian your patient was determined to meet criteria / has evidence of the following diagnosis/diagnoses:          [ ]  Mild Protein Calorie Malnutrition        [ ]  Moderate Protein Calorie Malnutrition        [ ] Severe Protein Calorie Malnutrition        [ ] Unspecified Protein Calorie Malnutrition        [ ] Underweight / BMI <19        [x ] Morbid Obesity / BMI > 40      Findings as based on:  •  Comprehensive nutrition assessment and consultation    Treatment:    The following diet has been recommended:    C/w current diet     PROVIDER Section:     By signing this assessment you are acknowledging and agree with the diagnosis/diagnoses assigned by the Registered Dietitian    Comments:

## 2020-06-09 NOTE — DIETITIAN INITIAL EVALUATION ADULT. - ENERGY NEEDS
Ideal body weight used for calculations as pt >120% of IBW.   ABW 76.1kg, IBW 32kg, 237% IBW, ht 54", BMI 40.6   Nutrient needs based on [facility] standards of care for maintenance in adults, adjusted for bariatric   320- 384kcal (10-12kcal/kg), 48- 57g pro (1.5-1.8g/kg pro), 960mL (30ml/kg)

## 2020-06-09 NOTE — DISCHARGE NOTE PROVIDER - NSDCFUADDINST_GEN_ALL_CORE_FT
Follow up with  in 1 week. Call the office at  to schedule your appointment. You may shower; soap and water over incision sites. Do not scrub. Pat dry when done. No tub bathing or swimming until cleared. Keep incision sites out of the sun as scars will darken. No heavy lifting (>10lbs) or strenuous exercise. Diet: Bariatric Full Fluids. 60 grams protein daily.  64 fluid ounces water daily. Drink small sips throughout the day. Continue diet as outlined by paperwork received as a pre-operative patient. You should be urinating at least 3-4x per day. Call the office if you experience increasing abdominal pain, nausea, vomiting, or temperature >100.4F.  NO ASPIRIN OR NSAIDs until approved by Dr. Bowen. Avoid alcoholic beverages until cleared by Dr. Bowen.   1)  Please take Tylenol 650 mg every 4 to 6 hours by mouth for moderate to severe pain control.   2) Please take Protonix 40 mg once a day by mouth.

## 2020-06-10 ENCOUNTER — TRANSCRIPTION ENCOUNTER (OUTPATIENT)
Age: 70
End: 2020-06-10

## 2020-06-10 VITALS
TEMPERATURE: 98 F | OXYGEN SATURATION: 94 % | HEART RATE: 64 BPM | RESPIRATION RATE: 17 BRPM | DIASTOLIC BLOOD PRESSURE: 74 MMHG | SYSTOLIC BLOOD PRESSURE: 121 MMHG

## 2020-06-10 DIAGNOSIS — Z71.89 OTHER SPECIFIED COUNSELING: ICD-10-CM

## 2020-06-10 LAB
ANION GAP SERPL CALC-SCNC: 13 MMOL/L — SIGNIFICANT CHANGE UP (ref 5–17)
BUN SERPL-MCNC: 12 MG/DL — SIGNIFICANT CHANGE UP (ref 7–23)
CALCIUM SERPL-MCNC: 8.4 MG/DL — SIGNIFICANT CHANGE UP (ref 8.4–10.5)
CHLORIDE SERPL-SCNC: 103 MMOL/L — SIGNIFICANT CHANGE UP (ref 96–108)
CO2 SERPL-SCNC: 28 MMOL/L — SIGNIFICANT CHANGE UP (ref 22–31)
CREAT SERPL-MCNC: 0.58 MG/DL — SIGNIFICANT CHANGE UP (ref 0.5–1.3)
GLUCOSE BLDC GLUCOMTR-MCNC: 108 MG/DL — HIGH (ref 70–99)
GLUCOSE SERPL-MCNC: 104 MG/DL — HIGH (ref 70–99)
HCT VFR BLD CALC: 36 % — SIGNIFICANT CHANGE UP (ref 34.5–45)
HGB BLD-MCNC: 11.8 G/DL — SIGNIFICANT CHANGE UP (ref 11.5–15.5)
MAGNESIUM SERPL-MCNC: 1.9 MG/DL — SIGNIFICANT CHANGE UP (ref 1.6–2.6)
MCHC RBC-ENTMCNC: 32.5 PG — SIGNIFICANT CHANGE UP (ref 27–34)
MCHC RBC-ENTMCNC: 32.8 GM/DL — SIGNIFICANT CHANGE UP (ref 32–36)
MCV RBC AUTO: 99.2 FL — SIGNIFICANT CHANGE UP (ref 80–100)
NRBC # BLD: 0 /100 WBCS — SIGNIFICANT CHANGE UP (ref 0–0)
PHOSPHATE SERPL-MCNC: 1.2 MG/DL — LOW (ref 2.5–4.5)
PLATELET # BLD AUTO: 279 K/UL — SIGNIFICANT CHANGE UP (ref 150–400)
POTASSIUM SERPL-MCNC: 3.4 MMOL/L — LOW (ref 3.5–5.3)
POTASSIUM SERPL-SCNC: 3.4 MMOL/L — LOW (ref 3.5–5.3)
RBC # BLD: 3.63 M/UL — LOW (ref 3.8–5.2)
RBC # FLD: 13.8 % — SIGNIFICANT CHANGE UP (ref 10.3–14.5)
SODIUM SERPL-SCNC: 144 MMOL/L — SIGNIFICANT CHANGE UP (ref 135–145)
SURGICAL PATHOLOGY STUDY: SIGNIFICANT CHANGE UP
WBC # BLD: 10.18 K/UL — SIGNIFICANT CHANGE UP (ref 3.8–10.5)
WBC # FLD AUTO: 10.18 K/UL — SIGNIFICANT CHANGE UP (ref 3.8–10.5)

## 2020-06-10 PROCEDURE — 86850 RBC ANTIBODY SCREEN: CPT

## 2020-06-10 PROCEDURE — 36415 COLL VENOUS BLD VENIPUNCTURE: CPT

## 2020-06-10 PROCEDURE — 86803 HEPATITIS C AB TEST: CPT

## 2020-06-10 PROCEDURE — 83036 HEMOGLOBIN GLYCOSYLATED A1C: CPT

## 2020-06-10 PROCEDURE — 80048 BASIC METABOLIC PNL TOTAL CA: CPT

## 2020-06-10 PROCEDURE — 86901 BLOOD TYPING SEROLOGIC RH(D): CPT

## 2020-06-10 PROCEDURE — C1889: CPT

## 2020-06-10 PROCEDURE — 88341 IMHCHEM/IMCYTCHM EA ADD ANTB: CPT

## 2020-06-10 PROCEDURE — 88311 DECALCIFY TISSUE: CPT

## 2020-06-10 PROCEDURE — 85025 COMPLETE CBC W/AUTO DIFF WBC: CPT

## 2020-06-10 PROCEDURE — 88307 TISSUE EXAM BY PATHOLOGIST: CPT

## 2020-06-10 PROCEDURE — 84100 ASSAY OF PHOSPHORUS: CPT

## 2020-06-10 PROCEDURE — 94660 CPAP INITIATION&MGMT: CPT

## 2020-06-10 PROCEDURE — 83735 ASSAY OF MAGNESIUM: CPT

## 2020-06-10 PROCEDURE — 85027 COMPLETE CBC AUTOMATED: CPT

## 2020-06-10 PROCEDURE — 82962 GLUCOSE BLOOD TEST: CPT

## 2020-06-10 RX ORDER — PANTOPRAZOLE SODIUM 20 MG/1
1 TABLET, DELAYED RELEASE ORAL
Qty: 30 | Refills: 0
Start: 2020-06-10 | End: 2020-07-09

## 2020-06-10 RX ORDER — POTASSIUM CHLORIDE 20 MEQ
40 PACKET (EA) ORAL ONCE
Refills: 0 | Status: COMPLETED | OUTPATIENT
Start: 2020-06-10 | End: 2020-06-10

## 2020-06-10 RX ADMIN — Medication 15 MILLIGRAM(S): at 05:56

## 2020-06-10 RX ADMIN — Medication 40 MILLIEQUIVALENT(S): at 09:02

## 2020-06-10 RX ADMIN — ENOXAPARIN SODIUM 60 MILLIGRAM(S): 100 INJECTION SUBCUTANEOUS at 09:02

## 2020-06-10 RX ADMIN — SCOPALAMINE 1 PATCH: 1 PATCH, EXTENDED RELEASE TRANSDERMAL at 05:56

## 2020-06-10 NOTE — PROGRESS NOTE ADULT - SUBJECTIVE AND OBJECTIVE BOX
OVERNIGHT EVENTS: pt voiding, encouraged pt to ambualte, odilia clears, VSS   6/9:  cc , started on clears tolerating , ambulating , vss    STATUS POST:  lap sleeve gastrectomy 2cm solid mass on the superior greater    POST OPERATIVE DAY #: 2    SUBJECTIVE: Patient examined bedside with chief resident. Patient complains of incisional pain improved and nausea and emesis resolved. Patient reports she is ambulating and using incentive spirometer. Patient denies emesis, SOB and chest pain. Patient making adequate urine.        enoxaparin Injectable 60 milliGRAM(s) SubCutaneous every 12 hours      Vital Signs Last 24 Hrs  T(C): 36.7 (10 Justin 2020 04:49), Max: 36.9 (09 Jun 2020 20:25)  T(F): 98 (10 Justin 2020 04:49), Max: 98.5 (09 Jun 2020 20:25)  HR: 69 (10 Justin 2020 04:49) (64 - 73)  BP: 158/75 (10 Justin 2020 04:49) (126/71 - 158/75)  BP(mean): --  RR: 17 (10 Justin 2020 04:49) (16 - 18)  SpO2: 94% (10 Justin 2020 04:49) (94% - 97%)  I&O's Detail    08 Jun 2020 07:01  -  09 Jun 2020 07:00  --------------------------------------------------------  IN:    IV PiggyBack: 100 mL    lactated ringers.: 2100 mL  Total IN: 2200 mL    OUT:    Post-Void Residual per Intermittent Catheterization: 750 mL    Voided: 580 mL  Total OUT: 1330 mL    Total NET: 870 mL      09 Jun 2020 07:01  -  10 Justin 2020 06:19  --------------------------------------------------------  IN:    IV PiggyBack: 200 mL    lactated ringers.: 300 mL    lactated ringers.: 1275 mL    Oral Fluid: 600 mL  Total IN: 2375 mL    OUT:    Voided: 2000 mL  Total OUT: 2000 mL    Total NET: 375 mL          General: NAD, resting comfortably in bed  C/V: NSR  Pulm: Nonlabored breathing, no respiratory distress  Abd: soft, non distended , TTP around incision site , incision clean dry and intact   Extrem: WWP, no edema, SCDs in place      LABS:                        12.8   9.41  )-----------( 307      ( 09 Jun 2020 06:16 )             39.1     06-09    139  |  98  |  14  ----------------------------<  143<H>  3.5   |  25  |  0.72    Ca    8.9      09 Jun 2020 06:16  Phos  3.0     06-09  Mg     1.9     06-09 OVERNIGHT EVENTS: pt voiding, encouraged pt to ambualte, odilia clears, VSS   6/9:  cc , started on clears tolerating , ambulating , vss    STATUS POST:  lap sleeve gastrectomy 2cm solid mass on the superior greater    POST OPERATIVE DAY #: 2    SUBJECTIVE: Patient examined bedside with chief resident. Patient reports incisional pain improved and nausea and emesis resolved. Patient reports she is ambulating and using incentive spirometer. Patient denies emesis, SOB and chest pain. Patient making adequate urine.        enoxaparin Injectable 60 milliGRAM(s) SubCutaneous every 12 hours      Vital Signs Last 24 Hrs  T(C): 36.7 (10 Justin 2020 04:49), Max: 36.9 (09 Jun 2020 20:25)  T(F): 98 (10 Justin 2020 04:49), Max: 98.5 (09 Jun 2020 20:25)  HR: 69 (10 Justin 2020 04:49) (64 - 73)  BP: 158/75 (10 Justin 2020 04:49) (126/71 - 158/75)  BP(mean): --  RR: 17 (10 Justin 2020 04:49) (16 - 18)  SpO2: 94% (10 Justin 2020 04:49) (94% - 97%)  I&O's Detail    08 Jun 2020 07:01  -  09 Jun 2020 07:00  --------------------------------------------------------  IN:    IV PiggyBack: 100 mL    lactated ringers.: 2100 mL  Total IN: 2200 mL    OUT:    Post-Void Residual per Intermittent Catheterization: 750 mL    Voided: 580 mL  Total OUT: 1330 mL    Total NET: 870 mL      09 Jun 2020 07:01  -  10 Justin 2020 06:19  --------------------------------------------------------  IN:    IV PiggyBack: 200 mL    lactated ringers.: 300 mL    lactated ringers.: 1275 mL    Oral Fluid: 600 mL  Total IN: 2375 mL    OUT:    Voided: 2000 mL  Total OUT: 2000 mL    Total NET: 375 mL          General: NAD, resting comfortably in bed  C/V: NSR  Pulm: Nonlabored breathing, no respiratory distress  Abd: soft, non distended , TTP around incision site , incision clean dry and intact   Extrem: WWP, no edema, SCDs in place      LABS:                        12.8   9.41  )-----------( 307      ( 09 Jun 2020 06:16 )             39.1     06-09    139  |  98  |  14  ----------------------------<  143<H>  3.5   |  25  |  0.72    Ca    8.9      09 Jun 2020 06:16  Phos  3.0     06-09  Mg     1.9     06-09

## 2020-06-10 NOTE — DISCHARGE NOTE NURSING/CASE MANAGEMENT/SOCIAL WORK - PATIENT PORTAL LINK FT
You can access the FollowMyHealth Patient Portal offered by [facility] by registering at the following website: [email or web address]. By joining [facility]’s FollowMyHealth portal, you will also be able to view your health information using other applications (apps) compatible with our system.

## 2020-06-10 NOTE — PROGRESS NOTE ADULT - ASSESSMENT
69F with PMH of HTN, HLD, PreDM, Morbid obesity (BMI 51), KARINE on CPAP, OA, hypothyroidism, PSH of C-sections x2, carpal tunnel surgery, foot surgery, percutaneous nephrolithotomy s/p  lap sleeve gastrectomy 2cm solid mass on the superior greater 6/8    -Pain/nausea control  -BCLD, IVF  -Protonix  -Lovenox DVT ppx   -SCDs, OOB/A, IS  -AM labs  -Discharge home today

## 2020-06-11 ENCOUNTER — INPATIENT (INPATIENT)
Facility: HOSPITAL | Age: 70
LOS: 0 days | Discharge: HOME | DRG: 378 | End: 2020-06-12
Attending: SURGERY | Admitting: SURGERY
Payer: MEDICARE

## 2020-06-11 VITALS
HEART RATE: 69 BPM | OXYGEN SATURATION: 95 % | HEIGHT: 55 IN | WEIGHT: 167.99 LBS | SYSTOLIC BLOOD PRESSURE: 172 MMHG | TEMPERATURE: 99 F | DIASTOLIC BLOOD PRESSURE: 87 MMHG | RESPIRATION RATE: 16 BRPM

## 2020-06-11 DIAGNOSIS — Z98.890 OTHER SPECIFIED POSTPROCEDURAL STATES: Chronic | ICD-10-CM

## 2020-06-11 DIAGNOSIS — Z98.891 HISTORY OF UTERINE SCAR FROM PREVIOUS SURGERY: Chronic | ICD-10-CM

## 2020-06-11 DIAGNOSIS — E66.01 MORBID (SEVERE) OBESITY DUE TO EXCESS CALORIES: ICD-10-CM

## 2020-06-11 DIAGNOSIS — Z98.51 TUBAL LIGATION STATUS: Chronic | ICD-10-CM

## 2020-06-11 LAB
ALBUMIN SERPL ELPH-MCNC: 4.2 G/DL — SIGNIFICANT CHANGE UP (ref 3.3–5)
ALP SERPL-CCNC: 44 U/L — SIGNIFICANT CHANGE UP (ref 40–120)
ALT FLD-CCNC: 36 U/L — SIGNIFICANT CHANGE UP (ref 10–45)
ANION GAP SERPL CALC-SCNC: 15 MMOL/L — SIGNIFICANT CHANGE UP (ref 5–17)
APTT BLD: 26.7 SEC — LOW (ref 27.5–36.3)
AST SERPL-CCNC: 41 U/L — HIGH (ref 10–40)
BASOPHILS # BLD AUTO: 0.03 K/UL — SIGNIFICANT CHANGE UP (ref 0–0.2)
BASOPHILS # BLD AUTO: 0.03 K/UL — SIGNIFICANT CHANGE UP (ref 0–0.2)
BASOPHILS NFR BLD AUTO: 0.3 % — SIGNIFICANT CHANGE UP (ref 0–2)
BASOPHILS NFR BLD AUTO: 0.3 % — SIGNIFICANT CHANGE UP (ref 0–2)
BILIRUB SERPL-MCNC: 1 MG/DL — SIGNIFICANT CHANGE UP (ref 0.2–1.2)
BLD GP AB SCN SERPL QL: NEGATIVE — SIGNIFICANT CHANGE UP
BUN SERPL-MCNC: 15 MG/DL — SIGNIFICANT CHANGE UP (ref 7–23)
CALCIUM SERPL-MCNC: 8.9 MG/DL — SIGNIFICANT CHANGE UP (ref 8.4–10.5)
CHLORIDE SERPL-SCNC: 104 MMOL/L — SIGNIFICANT CHANGE UP (ref 96–108)
CO2 SERPL-SCNC: 25 MMOL/L — SIGNIFICANT CHANGE UP (ref 22–31)
CREAT SERPL-MCNC: 0.58 MG/DL — SIGNIFICANT CHANGE UP (ref 0.5–1.3)
EOSINOPHIL # BLD AUTO: 0.03 K/UL — SIGNIFICANT CHANGE UP (ref 0–0.5)
EOSINOPHIL # BLD AUTO: 0.04 K/UL — SIGNIFICANT CHANGE UP (ref 0–0.5)
EOSINOPHIL NFR BLD AUTO: 0.3 % — SIGNIFICANT CHANGE UP (ref 0–6)
EOSINOPHIL NFR BLD AUTO: 0.4 % — SIGNIFICANT CHANGE UP (ref 0–6)
GLUCOSE SERPL-MCNC: 120 MG/DL — HIGH (ref 70–99)
HCT VFR BLD CALC: 30.6 % — LOW (ref 34.5–45)
HCT VFR BLD CALC: 32.1 % — LOW (ref 34.5–45)
HGB BLD-MCNC: 10.5 G/DL — LOW (ref 11.5–15.5)
HGB BLD-MCNC: 9.8 G/DL — LOW (ref 11.5–15.5)
IMM GRANULOCYTES NFR BLD AUTO: 0.3 % — SIGNIFICANT CHANGE UP (ref 0–1.5)
IMM GRANULOCYTES NFR BLD AUTO: 0.3 % — SIGNIFICANT CHANGE UP (ref 0–1.5)
INR BLD: 1.35 — HIGH (ref 0.88–1.16)
LACTATE SERPL-SCNC: 1.8 MMOL/L — SIGNIFICANT CHANGE UP (ref 0.5–2)
LIDOCAIN IGE QN: 81 U/L — HIGH (ref 7–60)
LYMPHOCYTES # BLD AUTO: 2.24 K/UL — SIGNIFICANT CHANGE UP (ref 1–3.3)
LYMPHOCYTES # BLD AUTO: 2.42 K/UL — SIGNIFICANT CHANGE UP (ref 1–3.3)
LYMPHOCYTES # BLD AUTO: 21.8 % — SIGNIFICANT CHANGE UP (ref 13–44)
LYMPHOCYTES # BLD AUTO: 22.9 % — SIGNIFICANT CHANGE UP (ref 13–44)
MCHC RBC-ENTMCNC: 31.7 PG — SIGNIFICANT CHANGE UP (ref 27–34)
MCHC RBC-ENTMCNC: 32 GM/DL — SIGNIFICANT CHANGE UP (ref 32–36)
MCHC RBC-ENTMCNC: 32 PG — SIGNIFICANT CHANGE UP (ref 27–34)
MCHC RBC-ENTMCNC: 32.7 GM/DL — SIGNIFICANT CHANGE UP (ref 32–36)
MCV RBC AUTO: 97.9 FL — SIGNIFICANT CHANGE UP (ref 80–100)
MCV RBC AUTO: 99 FL — SIGNIFICANT CHANGE UP (ref 80–100)
MONOCYTES # BLD AUTO: 0.66 K/UL — SIGNIFICANT CHANGE UP (ref 0–0.9)
MONOCYTES # BLD AUTO: 0.69 K/UL — SIGNIFICANT CHANGE UP (ref 0–0.9)
MONOCYTES NFR BLD AUTO: 6.2 % — SIGNIFICANT CHANGE UP (ref 2–14)
MONOCYTES NFR BLD AUTO: 6.7 % — SIGNIFICANT CHANGE UP (ref 2–14)
NEUTROPHILS # BLD AUTO: 6.8 K/UL — SIGNIFICANT CHANGE UP (ref 1.8–7.4)
NEUTROPHILS # BLD AUTO: 7.89 K/UL — HIGH (ref 1.8–7.4)
NEUTROPHILS NFR BLD AUTO: 69.4 % — SIGNIFICANT CHANGE UP (ref 43–77)
NEUTROPHILS NFR BLD AUTO: 71.1 % — SIGNIFICANT CHANGE UP (ref 43–77)
NRBC # BLD: 0 /100 WBCS — SIGNIFICANT CHANGE UP (ref 0–0)
NRBC # BLD: 0 /100 WBCS — SIGNIFICANT CHANGE UP (ref 0–0)
OB PNL STL: POSITIVE
PLATELET # BLD AUTO: 293 K/UL — SIGNIFICANT CHANGE UP (ref 150–400)
PLATELET # BLD AUTO: 321 K/UL — SIGNIFICANT CHANGE UP (ref 150–400)
POTASSIUM SERPL-MCNC: 3.5 MMOL/L — SIGNIFICANT CHANGE UP (ref 3.5–5.3)
POTASSIUM SERPL-SCNC: 3.5 MMOL/L — SIGNIFICANT CHANGE UP (ref 3.5–5.3)
PROT SERPL-MCNC: 7.5 G/DL — SIGNIFICANT CHANGE UP (ref 6–8.3)
PROTHROM AB SERPL-ACNC: 15.5 SEC — HIGH (ref 10–12.9)
RBC # BLD: 3.09 M/UL — LOW (ref 3.8–5.2)
RBC # BLD: 3.28 M/UL — LOW (ref 3.8–5.2)
RBC # FLD: 13.4 % — SIGNIFICANT CHANGE UP (ref 10.3–14.5)
RBC # FLD: 13.4 % — SIGNIFICANT CHANGE UP (ref 10.3–14.5)
RH IG SCN BLD-IMP: POSITIVE — SIGNIFICANT CHANGE UP
SARS-COV-2 RNA SPEC QL NAA+PROBE: SIGNIFICANT CHANGE UP
SODIUM SERPL-SCNC: 144 MMOL/L — SIGNIFICANT CHANGE UP (ref 135–145)
WBC # BLD: 11.09 K/UL — HIGH (ref 3.8–10.5)
WBC # BLD: 9.8 K/UL — SIGNIFICANT CHANGE UP (ref 3.8–10.5)
WBC # FLD AUTO: 11.09 K/UL — HIGH (ref 3.8–10.5)
WBC # FLD AUTO: 9.8 K/UL — SIGNIFICANT CHANGE UP (ref 3.8–10.5)

## 2020-06-11 PROCEDURE — 99285 EMERGENCY DEPT VISIT HI MDM: CPT

## 2020-06-11 PROCEDURE — 93010 ELECTROCARDIOGRAM REPORT: CPT

## 2020-06-11 RX ORDER — INSULIN LISPRO 100/ML
VIAL (ML) SUBCUTANEOUS
Refills: 0 | Status: DISCONTINUED | OUTPATIENT
Start: 2020-06-11 | End: 2020-06-12

## 2020-06-11 RX ORDER — GLUCAGON INJECTION, SOLUTION 0.5 MG/.1ML
1 INJECTION, SOLUTION SUBCUTANEOUS ONCE
Refills: 0 | Status: DISCONTINUED | OUTPATIENT
Start: 2020-06-11 | End: 2020-06-12

## 2020-06-11 RX ORDER — SODIUM CHLORIDE 9 MG/ML
1000 INJECTION, SOLUTION INTRAVENOUS
Refills: 0 | Status: DISCONTINUED | OUTPATIENT
Start: 2020-06-11 | End: 2020-06-12

## 2020-06-11 RX ORDER — DEXTROSE 50 % IN WATER 50 %
15 SYRINGE (ML) INTRAVENOUS ONCE
Refills: 0 | Status: DISCONTINUED | OUTPATIENT
Start: 2020-06-11 | End: 2020-06-12

## 2020-06-11 RX ORDER — DEXTROSE 50 % IN WATER 50 %
25 SYRINGE (ML) INTRAVENOUS ONCE
Refills: 0 | Status: DISCONTINUED | OUTPATIENT
Start: 2020-06-11 | End: 2020-06-12

## 2020-06-11 RX ORDER — LEVOTHYROXINE SODIUM 125 MCG
25 TABLET ORAL DAILY
Refills: 0 | Status: DISCONTINUED | OUTPATIENT
Start: 2020-06-12 | End: 2020-06-12

## 2020-06-11 RX ORDER — SODIUM CHLORIDE 9 MG/ML
1000 INJECTION, SOLUTION INTRAVENOUS
Refills: 0 | Status: DISCONTINUED | OUTPATIENT
Start: 2020-06-11 | End: 2020-06-11

## 2020-06-11 RX ORDER — ATENOLOL 25 MG/1
25 TABLET ORAL DAILY
Refills: 0 | Status: DISCONTINUED | OUTPATIENT
Start: 2020-06-12 | End: 2020-06-12

## 2020-06-11 RX ORDER — DEXTROSE 50 % IN WATER 50 %
12.5 SYRINGE (ML) INTRAVENOUS ONCE
Refills: 0 | Status: DISCONTINUED | OUTPATIENT
Start: 2020-06-11 | End: 2020-06-12

## 2020-06-11 RX ORDER — PANTOPRAZOLE SODIUM 20 MG/1
40 TABLET, DELAYED RELEASE ORAL DAILY
Refills: 0 | Status: DISCONTINUED | OUTPATIENT
Start: 2020-06-11 | End: 2020-06-12

## 2020-06-11 RX ORDER — ATORVASTATIN CALCIUM 80 MG/1
20 TABLET, FILM COATED ORAL AT BEDTIME
Refills: 0 | Status: DISCONTINUED | OUTPATIENT
Start: 2020-06-11 | End: 2020-06-12

## 2020-06-11 RX ADMIN — ATORVASTATIN CALCIUM 20 MILLIGRAM(S): 80 TABLET, FILM COATED ORAL at 23:44

## 2020-06-11 RX ADMIN — PANTOPRAZOLE SODIUM 40 MILLIGRAM(S): 20 TABLET, DELAYED RELEASE ORAL at 23:45

## 2020-06-11 NOTE — ED PROVIDER NOTE - OBJECTIVE STATEMENT
Pt w/ PMHx HTN, HLD, pre-DM, MO, KARINE on CPAP, OA, hypothyroidism, PSHx of C-sections x2, carpal tunnel surgery, foot surgery, percutaneous nephrolithotomy, s/p admission 6/8-6/10 for lap sleeve gastrectomy and excision of 2cm solid mass on the superior greater w/ Dr Bowen, now p/w 1 episode of dark red blood from the rectum, w/o BM today, and mild, 3/10, int LLQ pain. Pt denies n/v. No f/c. Last colonoscopy earlier this year, wnl a/p pt. No hx diverticular disease, nor ulcers. No AC use. Pt called Dr Cain and was advised to come to the ED. Pt reports hx hemorrhoids. Denies rectal pain. No hx anemia

## 2020-06-11 NOTE — H&P ADULT - NSHPPHYSICALEXAM_GEN_ALL_CORE
Vital Signs Last 24 Hrs  T(C): 36.5 (11 Jun 2020 22:00), Max: 37.2 (11 Jun 2020 18:38)  T(F): 97.7 (11 Jun 2020 22:00), Max: 99 (11 Jun 2020 18:38)  HR: 70 (11 Jun 2020 22:00) (65 - 70)  BP: 129/80 (11 Jun 2020 22:00) (129/80 - 172/87)  BP(mean): --  RR: 18 (11 Jun 2020 22:00) (16 - 18)  SpO2: 99% (11 Jun 2020 22:00) (95% - 99%)    General: NAD, resting comfortably  Neuro: AAOX3, EOMI, PERRLA, no scleral icterus  Pulm: CTAB, Nonlabored breathing  CV: S1/S2 normal, no murmurs  Abdomen: soft, obese nondistended, nontender, no rebound, no guarding. Incisions c/d/i, no evidence of bleeding or exudates  Rectum: Externally clean and dry without evidence of blood, small 1cm hemorrhoid. KAREEM well tolerated, no masses appreciated, nontender with small dark coffee-ground residue on finger  Extremities: WWP, No LE edema b/l

## 2020-06-11 NOTE — ED PROVIDER NOTE - PROGRESS NOTE DETAILS
Surgery consulted and will see the pt Pt had 2 episodes of BRBPR in the ED w/o BM. I d/w Dr Bowen directly - will admit for further observation, serial H/H PT again had 3rd episode of BRBPR in the ED w/o BM. Pt flushed w/o showing staff

## 2020-06-11 NOTE — CONSULT NOTE ADULT - ASSESSMENT
A/P: 69F PMH HTN, HLD, PreDM, Morbid obesity (BMI 51), KARINE on CPAP, OA, hypothyroidism, recent admission for lap sleeve gastrectomy and resection of 2cm gastric lesion (bx-GIST) (6/8), discharge yesterday, representing with one day of dark stool per rectum.    -Pt AVSS, without abdominal pain, tolerating diet, abdomen benign, surgical incisions c/d/i, Hg 10.5, no General Surgery intervention at this time  -Recommend discharge home with instruction to follow up with Dr. Bowen [phone] in office this week  -Discussed with chief resident and Dr. Bowen A/P: 69F PMH HTN, HLD, PreDM, Morbid obesity (BMI 51), KARINE on CPAP, OA, hypothyroidism, recent admission for lap sleeve gastrectomy and resection of 2cm gastric lesion (bx-GIST) (6/8), discharge yesterday, representing with one day of dark stool per rectum.    -Pt AVSS, without abdominal pain, tolerating diet, abdomen benign, surgical incisions c/d/i, Hg 10.5, repeat Hg 9.8. Will admit to General Surgery for serial CBC and further monitoring  -Discussed with chief resident and Dr. Bowen

## 2020-06-11 NOTE — ED PROVIDER NOTE - CLINICAL SUMMARY MEDICAL DECISION MAKING FREE TEXT BOX
Pt p/w BRBPR s/p bariatric surgery 3 days ago, referred to the ED by surgeon Dr Cain. DDx includes but not limited to post-op complication, hemorrhoidal bleeding, diverticular bleeding, less likely other acute pathology. Benign abdomen. Check labs, surgery consult. Dispo pending w/u, clinical status, surgery recommendations

## 2020-06-11 NOTE — ED ADULT TRIAGE NOTE - CHIEF COMPLAINT QUOTE
pt had gastric by pass surgery on Monday, c/o rectal bleeding with LLQ abdominal pain that started today.

## 2020-06-11 NOTE — H&P ADULT - ASSESSMENT
A/P: 69F PMH HTN, HLD, PreDM, Morbid obesity (BMI 51), KARINE on CPAP, OA, hypothyroidism, recent admission for lap sleeve gastrectomy and resection of 2cm gastric lesion (bx-GIST) (6/8), discharge yesterday, representing with one day of dark stool per rectum.    Admit to General surgery, regional Dr. Bowen  Bariatric clear liquid diet  Continue to monitor for dark stools per rectum  SCDs, holding Lovenox in setting of possible LGIB  AM labs, follow up am Hg  Plan discussed with Dr. Bowen

## 2020-06-11 NOTE — H&P ADULT - NSHPLABSRESULTS_GEN_ALL_CORE
9.8    9.80  )-----------( 293      ( 11 Jun 2020 22:18 )             30.6     06-11    144  |  104  |  15  ----------------------------<  120<H>  3.5   |  25  |  0.58    Ca    8.9      11 Jun 2020 19:45  Phos  1.2     06-10  Mg     1.9     06-10    TPro  7.5  /  Alb  4.2  /  TBili  1.0  /  DBili  x   /  AST  41<H>  /  ALT  36  /  AlkPhos  44  06-11

## 2020-06-11 NOTE — ED ADULT NURSE NOTE - OBJECTIVE STATEMENT
PT NOTICED BLOOD IN STOOL TODAY. ALSO C/O RECTAL DISCOMFORT. PT HAD GASTRIC SURGERY MONDAY OF THIS WEEK.

## 2020-06-11 NOTE — H&P ADULT - HISTORY OF PRESENT ILLNESS
HPI: 69F PMH HTN, HLD, PreDM, Morbid obesity (BMI 51), KARINE on CPAP, OA, hypothyroidism, recent admission for lap sleeve gastrectomy and resection of 2cm gastric lesion (bx-GIST) (6/8), discharged yesterday, representing with one day of dark stool per rectum. At 3:30 pm this afternoon pt reports noticing dark coffee ground stool in the toilet, with two smaller episodes in the ED this evening. She otherwise denies abdominal pain, tolerating clear liquid diet, denies N/V/chest pain/dyspnea, denies dizziness or weakness. Passing flatus, having 1-2 watery BM daily at baseline. Voiding well without dysuria/pyuria. She denies ever having similar sx in past, denies taking NSAIDs today for pain. Last cscope 2 yrs ago and demonstrates hemorrhoids, last EGD 2019 which demonstrates HPylori for which she was treated. Denies family hx of cancer.    PMH: as above  PSH: as above and C-sections x2, carpal tunnel surgery, foot surgery, percutaneous nephrolithotomy  Meds: Atenolol 25 daily, Protonix 40d, Levothyroxine 25 d, Atorvastatin 20d  Social: Denies smoking, denies etoh use, denies illicit drug use  Allergies: NKDA, pepper, grapefruit

## 2020-06-11 NOTE — ED PROVIDER NOTE - PHYSICAL EXAMINATION
Limited PE performed in the setting of the COVID10 pandemic, in efforts to limit exposure and cross-contamination  Constitutional: Well appearing, well nourished, awake, alert, oriented to person, place, time/situation and in no apparent distress.  ENMT: Airway patent.   Eyes: Clear bilaterally. no conjunctival pallor  Cardiac: Normal rate, regular rhythm.   Respiratory: No increased WOB, tachypnea, hypoxia, or accessory mm use. Pt speaks in full sentences.   Gastrointestinal: Abd soft, NT, ND. No guarding, rebound, or rigidity.   Rectal: + dark red blood in the vault. no active bleeding. no hemorrhoids appreciated.   Musculoskeletal: Range of motion is not limited  Neuro: Alert and oriented x 3, face symmetric and speech fluent. Nml gross motor movement, grossly non focal   Skin: Skin normal color for race, warm, dry and intact. No evidence of rash.  Psych: Alert and oriented to person, place, time/situation. normal mood and affect. no apparent risk to self or others.

## 2020-06-11 NOTE — CONSULT NOTE ADULT - SUBJECTIVE AND OBJECTIVE BOX
HPI: 69F PMH HTN, HLD, PreDM, Morbid obesity (BMI 51), KARINE on CPAP, OA, hypothyroidism, recent admission for lap sleeve gastrectomy and resection of 2cm gastric lesion (bx-GIST) (6/8), discharged yesterday, representing with one day of dark stool per rectum. At 3:30 pm this afternoon pt reports noticing dark coffee ground stool in the toilet, with two smaller episodes in the ED this evening. She otherwise denies abdominal pain, tolerating clear liquid diet, denies N/V/chest pain/dyspnea, denies dizziness or weakness. Passing flatus, having 1-2 watery BM daily at baseline. Voiding well without dysuria/pyuria. She denies ever having similar sx in past. Last cscope 2 yrs ago and demonstrates hemorrhoids, last EGD 2019 which demonstrates HPylori for which she was treated. Denies family hx of cancer    PMH: as above  PSH: as above and C-sections x2, carpal tunnel surgery, foot surgery, percutaneous nephrolithotomy  Meds: as per med rec  Social: Denies smoking, denies etoh use, denies illicit drug use  Allergies: NKDA, pepper, grapefruit      Vital Signs Last 24 Hrs  T(C): 36.5 (11 Jun 2020 22:00), Max: 37.2 (11 Jun 2020 18:38)  T(F): 97.7 (11 Jun 2020 22:00), Max: 99 (11 Jun 2020 18:38)  HR: 70 (11 Jun 2020 22:00) (65 - 70)  BP: 129/80 (11 Jun 2020 22:00) (129/80 - 172/87)  BP(mean): --  RR: 18 (11 Jun 2020 22:00) (16 - 18)  SpO2: 99% (11 Jun 2020 22:00) (95% - 99%)    General: NAD, resting comfortably  Neuro: AAOX3, EOMI, PERRLA, no scleral icterus  Pulm: CTAB, Nonlabored breathing  CV: S1/S2 normal, no murmurs  Abdomen: soft, obese nondistended, nontender, no rebound, no guarding. Incisions c/d/i, no evidence of bleeding or exudates  Rectum: Externally clean and dry without evidence of blood, small 1cm hemorrhoid. KAREEM well tolerated, no masses appreciated, nontender with small dark coffee-ground residue on finger  Extremities: WWP, No LE edema b/l        LABS:                        10.5   11.09 )-----------( 321      ( 11 Jun 2020 19:45 )             32.1     06-11    144  |  104  |  15  ----------------------------<  120<H>  3.5   |  25  |  0.58    Ca    8.9      11 Jun 2020 19:45  Phos  1.2     06-10  Mg     1.9     06-10    TPro  7.5  /  Alb  4.2  /  TBili  1.0  /  DBili  x   /  AST  41<H>  /  ALT  36  /  AlkPhos  44  06-11    PT/INR - ( 11 Jun 2020 19:45 )   PT: 15.5 sec;   INR: 1.35          PTT - ( 11 Jun 2020 19:45 )  PTT:26.7 sec

## 2020-06-12 ENCOUNTER — TRANSCRIPTION ENCOUNTER (OUTPATIENT)
Age: 70
End: 2020-06-12

## 2020-06-12 VITALS
OXYGEN SATURATION: 95 % | DIASTOLIC BLOOD PRESSURE: 73 MMHG | RESPIRATION RATE: 17 BRPM | SYSTOLIC BLOOD PRESSURE: 120 MMHG | TEMPERATURE: 99 F | HEART RATE: 70 BPM

## 2020-06-12 DIAGNOSIS — Z98.890 OTHER SPECIFIED POSTPROCEDURAL STATES: ICD-10-CM

## 2020-06-12 DIAGNOSIS — E78.5 HYPERLIPIDEMIA, UNSPECIFIED: ICD-10-CM

## 2020-06-12 DIAGNOSIS — R73.03 PREDIABETES: ICD-10-CM

## 2020-06-12 DIAGNOSIS — G47.33 OBSTRUCTIVE SLEEP APNEA (ADULT) (PEDIATRIC): ICD-10-CM

## 2020-06-12 DIAGNOSIS — I10 ESSENTIAL (PRIMARY) HYPERTENSION: ICD-10-CM

## 2020-06-12 DIAGNOSIS — C49.A2 GASTROINTESTINAL STROMAL TUMOR OF STOMACH: ICD-10-CM

## 2020-06-12 DIAGNOSIS — Z91.018 ALLERGY TO OTHER FOODS: ICD-10-CM

## 2020-06-12 DIAGNOSIS — E66.01 MORBID (SEVERE) OBESITY DUE TO EXCESS CALORIES: ICD-10-CM

## 2020-06-12 DIAGNOSIS — E03.9 HYPOTHYROIDISM, UNSPECIFIED: ICD-10-CM

## 2020-06-12 DIAGNOSIS — Z87.442 PERSONAL HISTORY OF URINARY CALCULI: ICD-10-CM

## 2020-06-12 DIAGNOSIS — M81.0 AGE-RELATED OSTEOPOROSIS WITHOUT CURRENT PATHOLOGICAL FRACTURE: ICD-10-CM

## 2020-06-12 LAB
ANION GAP SERPL CALC-SCNC: 11 MMOL/L — SIGNIFICANT CHANGE UP (ref 5–17)
BUN SERPL-MCNC: 10 MG/DL — SIGNIFICANT CHANGE UP (ref 7–23)
CALCIUM SERPL-MCNC: 8.3 MG/DL — LOW (ref 8.4–10.5)
CHLORIDE SERPL-SCNC: 105 MMOL/L — SIGNIFICANT CHANGE UP (ref 96–108)
CO2 SERPL-SCNC: 27 MMOL/L — SIGNIFICANT CHANGE UP (ref 22–31)
CREAT SERPL-MCNC: 0.51 MG/DL — SIGNIFICANT CHANGE UP (ref 0.5–1.3)
GLUCOSE BLDC GLUCOMTR-MCNC: 87 MG/DL — SIGNIFICANT CHANGE UP (ref 70–99)
GLUCOSE BLDC GLUCOMTR-MCNC: 98 MG/DL — SIGNIFICANT CHANGE UP (ref 70–99)
GLUCOSE SERPL-MCNC: 80 MG/DL — SIGNIFICANT CHANGE UP (ref 70–99)
HCT VFR BLD CALC: 28.5 % — LOW (ref 34.5–45)
HCT VFR BLD CALC: 29.2 % — LOW (ref 34.5–45)
HGB BLD-MCNC: 9.1 G/DL — LOW (ref 11.5–15.5)
HGB BLD-MCNC: 9.1 G/DL — LOW (ref 11.5–15.5)
MAGNESIUM SERPL-MCNC: 2.1 MG/DL — SIGNIFICANT CHANGE UP (ref 1.6–2.6)
MCHC RBC-ENTMCNC: 31.2 GM/DL — LOW (ref 32–36)
MCHC RBC-ENTMCNC: 31.5 PG — SIGNIFICANT CHANGE UP (ref 27–34)
MCHC RBC-ENTMCNC: 31.9 GM/DL — LOW (ref 32–36)
MCHC RBC-ENTMCNC: 32.3 PG — SIGNIFICANT CHANGE UP (ref 27–34)
MCV RBC AUTO: 101 FL — HIGH (ref 80–100)
MCV RBC AUTO: 101.1 FL — HIGH (ref 80–100)
NRBC # BLD: 0 /100 WBCS — SIGNIFICANT CHANGE UP (ref 0–0)
NRBC # BLD: 0 /100 WBCS — SIGNIFICANT CHANGE UP (ref 0–0)
PHOSPHATE SERPL-MCNC: 1.6 MG/DL — LOW (ref 2.5–4.5)
PLATELET # BLD AUTO: 260 K/UL — SIGNIFICANT CHANGE UP (ref 150–400)
PLATELET # BLD AUTO: 289 K/UL — SIGNIFICANT CHANGE UP (ref 150–400)
POTASSIUM SERPL-MCNC: 4 MMOL/L — SIGNIFICANT CHANGE UP (ref 3.5–5.3)
POTASSIUM SERPL-SCNC: 4 MMOL/L — SIGNIFICANT CHANGE UP (ref 3.5–5.3)
RBC # BLD: 2.82 M/UL — LOW (ref 3.8–5.2)
RBC # BLD: 2.89 M/UL — LOW (ref 3.8–5.2)
RBC # FLD: 13.3 % — SIGNIFICANT CHANGE UP (ref 10.3–14.5)
RBC # FLD: 13.3 % — SIGNIFICANT CHANGE UP (ref 10.3–14.5)
SODIUM SERPL-SCNC: 143 MMOL/L — SIGNIFICANT CHANGE UP (ref 135–145)
WBC # BLD: 8.23 K/UL — SIGNIFICANT CHANGE UP (ref 3.8–10.5)
WBC # BLD: 8.67 K/UL — SIGNIFICANT CHANGE UP (ref 3.8–10.5)
WBC # FLD AUTO: 8.23 K/UL — SIGNIFICANT CHANGE UP (ref 3.8–10.5)
WBC # FLD AUTO: 8.67 K/UL — SIGNIFICANT CHANGE UP (ref 3.8–10.5)

## 2020-06-12 PROCEDURE — 93005 ELECTROCARDIOGRAM TRACING: CPT

## 2020-06-12 PROCEDURE — 85025 COMPLETE CBC W/AUTO DIFF WBC: CPT

## 2020-06-12 PROCEDURE — 83605 ASSAY OF LACTIC ACID: CPT

## 2020-06-12 PROCEDURE — 85610 PROTHROMBIN TIME: CPT

## 2020-06-12 PROCEDURE — 99285 EMERGENCY DEPT VISIT HI MDM: CPT | Mod: 25

## 2020-06-12 PROCEDURE — 86901 BLOOD TYPING SEROLOGIC RH(D): CPT

## 2020-06-12 PROCEDURE — 86850 RBC ANTIBODY SCREEN: CPT

## 2020-06-12 PROCEDURE — 84100 ASSAY OF PHOSPHORUS: CPT

## 2020-06-12 PROCEDURE — 80053 COMPREHEN METABOLIC PANEL: CPT

## 2020-06-12 PROCEDURE — 82962 GLUCOSE BLOOD TEST: CPT

## 2020-06-12 PROCEDURE — 83690 ASSAY OF LIPASE: CPT

## 2020-06-12 PROCEDURE — 85730 THROMBOPLASTIN TIME PARTIAL: CPT

## 2020-06-12 PROCEDURE — 87635 SARS-COV-2 COVID-19 AMP PRB: CPT

## 2020-06-12 PROCEDURE — 36415 COLL VENOUS BLD VENIPUNCTURE: CPT

## 2020-06-12 PROCEDURE — 80048 BASIC METABOLIC PNL TOTAL CA: CPT

## 2020-06-12 PROCEDURE — 85027 COMPLETE CBC AUTOMATED: CPT

## 2020-06-12 PROCEDURE — 83735 ASSAY OF MAGNESIUM: CPT

## 2020-06-12 RX ORDER — ATORVASTATIN CALCIUM 80 MG/1
1 TABLET, FILM COATED ORAL
Qty: 0 | Refills: 0 | DISCHARGE
Start: 2020-06-12

## 2020-06-12 RX ORDER — PANTOPRAZOLE SODIUM 20 MG/1
40 TABLET, DELAYED RELEASE ORAL
Qty: 0 | Refills: 0 | DISCHARGE
Start: 2020-06-12

## 2020-06-12 RX ORDER — ATENOLOL 25 MG/1
1 TABLET ORAL
Qty: 0 | Refills: 0 | DISCHARGE
Start: 2020-06-12

## 2020-06-12 RX ORDER — LEVOTHYROXINE SODIUM 125 MCG
1 TABLET ORAL
Qty: 0 | Refills: 0 | DISCHARGE
Start: 2020-06-12

## 2020-06-12 RX ADMIN — PANTOPRAZOLE SODIUM 40 MILLIGRAM(S): 20 TABLET, DELAYED RELEASE ORAL at 11:14

## 2020-06-12 RX ADMIN — Medication 62.5 MILLIMOLE(S): at 07:55

## 2020-06-12 NOTE — DISCHARGE NOTE NURSING/CASE MANAGEMENT/SOCIAL WORK - NSDCPNINST_GEN_ALL_CORE
Call Dr. Bowen if you have any questions or concerns. Follow progression of diet as you were instructed  by Dr. Bowen.

## 2020-06-12 NOTE — DISCHARGE NOTE PROVIDER - CARE PROVIDER_API CALL
Mor Bowen A  SURGERY  [address] [address]  Phone: [phone]  Fax: [phone]  Follow Up Time: 2 weeks

## 2020-06-12 NOTE — DISCHARGE NOTE PROVIDER - HOSPITAL COURSE
Mrs Coleman presented to [facility] on the evening of 6/11 with LGIB after recent sleeve gastrectomy for morbid obesity. Patient was monitored with serial CBCs which showed a stabilization of her hgb and did not have any bloody stools. Given her stable hemodynamic status, she was fit for discharge.

## 2020-06-12 NOTE — DISCHARGE NOTE PROVIDER - NSDCMRMEDTOKEN_GEN_ALL_CORE_FT
atenolol 25 mg oral tablet: 1 tab(s) orally once a day  atorvastatin 20 mg oral tablet: orally once a day (at bedtime)  cholecalciferol 50,000 intl units (1250 mcg) oral capsule: 1 cap(s) orally once a week  Citracal + D 250 mg-500 intl units (12.5 mcg) oral tablet, chewable: 2 tab(s) orally 2 times a day (with meals)  levothyroxine 25 mcg (0.025 mg) oral tablet: 1 tab(s) orally once a day  Prolia 60 mg/mL subcutaneous solution: subcutaneous every 6 months  Protonix 40 mg oral delayed release tablet: 1 tab(s) orally once a day MDD:1 tablet  Restasis 0.05% ophthalmic emulsion: to each affected eye once a day  Tylenol Extra Strength 500 mg oral tablet: 2 tab(s) orally every 6 hours, As Needed -for moderate pain - for severe pain MDD:8

## 2020-06-12 NOTE — PROGRESS NOTE ADULT - ASSESSMENT
69F PMH HTN, HLD, PreDM, Morbid obesity (BMI 51), KARINE on CPAP, OA, hypothyroidism, recent admission for lap sleeve gastrectomy and resection of 2cm gastric lesion (bx-GIST) (6/8), discharge yesterday, representing with one day of dark stool per rectum.    Bariatric clear liquid diet  Continue to monitor for dark stools per rectum  SCDs, holding Lovenox in setting of possible LGIB  AM labs, follow up am Hg  Plan discussed with Dr. Bowen

## 2020-06-12 NOTE — DISCHARGE NOTE PROVIDER - NSDCFUADDAPPT_GEN_ALL_CORE_FT
Follow up with  in 1 week. Call the office at  to schedule your appointment. You may shower; soap and water over incision sites. Do not scrub. Pat dry when done. No tub bathing or swimming until cleared. Keep incision sites out of the sun as scars will darken. No heavy lifting (>10lbs) or strenuous exercise. Diet: Bariatric Full Fluids. 60 grams protein daily.  64 fluid ounces water daily. Drink small sips throughout the day. Continue diet as outlined by paperwork received as a pre-operative patient. You should be urinating at least 3-4x per day. Call the office if you experience increasing abdominal pain, nausea, vomiting, or temperature >100.4F.  NO ASPIRIN OR NSAIDs until approved by Dr. Bowen. Avoid alcoholic beverages until cleared by Dr. Bowen.

## 2020-06-12 NOTE — DISCHARGE NOTE NURSING/CASE MANAGEMENT/SOCIAL WORK - PATIENT PORTAL LINK FT
You can access the FollowMyHealth Patient Portal offered by [facility] by registering at the following website: [email or web address]. By joining [employer]’s FollowMyHealth portal, you will also be able to view your health information using other applications (apps) compatible with our system.

## 2020-06-12 NOTE — PROGRESS NOTE ADULT - SUBJECTIVE AND OBJECTIVE BOX
INTERVAL HPI: Patient seen and examined at bedside by chief resident. No BMs O/N, passing flatus. No N/V. Sips of bCLD ok, w/o issues.     ATENolol  Tablet 25        Vital Signs Last 24 Hrs  T(C): 37 (12 Jun 2020 04:58), Max: 37.2 (11 Jun 2020 18:38)  T(F): 98.6 (12 Jun 2020 04:58), Max: 99 (11 Jun 2020 18:38)  HR: 66 (12 Jun 2020 04:58) (65 - 77)  BP: 134/71 (12 Jun 2020 04:58) (129/80 - 172/87)  BP(mean): --  RR: 18 (12 Jun 2020 04:58) (16 - 18)  SpO2: 99% (12 Jun 2020 04:58) (95% - 99%)  I&O's Summary    11 Jun 2020 07:01  -  12 Jun 2020 07:00  --------------------------------------------------------  IN: 0 mL / OUT: 600 mL / NET: -600 mL        Physical Exam:  General: NAD  Pulmonary: Nonlabored breathing, no respiratory distress  Cardiovascular: RRR  Abdomen: soft, obese nondistended, nontender, no rebound, no guarding. Incisions c/d/i, no evidence of bleeding or exudates  Extremities: WWP, no edema        LABS:                        9.1    8.67  )-----------( 260      ( 12 Jun 2020 05:58 )             28.5     06-12    143  |  105  |  10  ----------------------------<  80  4.0   |  27  |  0.51    Ca    8.3<L>      12 Jun 2020 05:58  Phos  1.6     06-12  Mg     2.1     06-12    TPro  7.5  /  Alb  4.2  /  TBili  1.0  /  DBili  x   /  AST  41<H>  /  ALT  36  /  AlkPhos  44  06-11    PT/INR - ( 11 Jun 2020 19:45 )   PT: 15.5 sec;   INR: 1.35          PTT - ( 11 Jun 2020 19:45 )  PTT:26.7 sec      Assessment and Plan:

## 2020-06-14 DIAGNOSIS — Z91.018 ALLERGY TO OTHER FOODS: ICD-10-CM

## 2020-06-14 DIAGNOSIS — R73.03 PREDIABETES: ICD-10-CM

## 2020-06-14 DIAGNOSIS — K92.2 GASTROINTESTINAL HEMORRHAGE, UNSPECIFIED: ICD-10-CM

## 2020-06-14 DIAGNOSIS — Z98.51 TUBAL LIGATION STATUS: ICD-10-CM

## 2020-06-14 DIAGNOSIS — I10 ESSENTIAL (PRIMARY) HYPERTENSION: ICD-10-CM

## 2020-06-14 DIAGNOSIS — Z99.89 DEPENDENCE ON OTHER ENABLING MACHINES AND DEVICES: ICD-10-CM

## 2020-06-14 DIAGNOSIS — Z79.1 LONG TERM (CURRENT) USE OF NON-STEROIDAL ANTI-INFLAMMATORIES (NSAID): ICD-10-CM

## 2020-06-14 DIAGNOSIS — K62.5 HEMORRHAGE OF ANUS AND RECTUM: ICD-10-CM

## 2020-06-14 DIAGNOSIS — Z79.899 OTHER LONG TERM (CURRENT) DRUG THERAPY: ICD-10-CM

## 2020-06-14 DIAGNOSIS — Z90.3 ACQUIRED ABSENCE OF STOMACH [PART OF]: ICD-10-CM

## 2020-06-14 DIAGNOSIS — G47.33 OBSTRUCTIVE SLEEP APNEA (ADULT) (PEDIATRIC): ICD-10-CM

## 2020-06-14 DIAGNOSIS — E78.5 HYPERLIPIDEMIA, UNSPECIFIED: ICD-10-CM

## 2020-06-15 PROCEDURE — G9001: CPT

## 2020-06-16 ENCOUNTER — APPOINTMENT (OUTPATIENT)
Dept: SURGERY | Facility: CLINIC | Age: 70
End: 2020-06-16
Payer: MEDICARE

## 2020-06-16 VITALS
BODY MASS INDEX: 37.49 KG/M2 | WEIGHT: 162 LBS | TEMPERATURE: 98.4 F | HEIGHT: 55 IN | OXYGEN SATURATION: 97 % | SYSTOLIC BLOOD PRESSURE: 137 MMHG | HEART RATE: 72 BPM | DIASTOLIC BLOOD PRESSURE: 85 MMHG

## 2020-06-16 PROCEDURE — 99024 POSTOP FOLLOW-UP VISIT: CPT

## 2020-06-16 NOTE — HISTORY OF PRESENT ILLNESS
[de-identified] : Pt is a 68 y/o F 1 week s/p VSG s/p rehospitalization for ugi bleed, presents today feeling well. Denies fevers, chest pn, sob, calf pain, abd pain. States she has not had any n,v,c,d. States she is feeling well and is down 7 lbs since surgery. Reports walking daily for exercise, taking vit daily as directed. States she is having 2 protein shakes daily and 50 oz of water daily.

## 2020-06-22 ENCOUNTER — APPOINTMENT (OUTPATIENT)
Dept: BARIATRICS | Facility: CLINIC | Age: 70
End: 2020-06-22
Payer: MEDICARE

## 2020-06-22 PROCEDURE — 97803 MED NUTRITION INDIV SUBSEQ: CPT

## 2020-06-22 PROCEDURE — 98967 PH1 ASSMT&MGMT NQHP 11-20: CPT

## 2020-06-26 ENCOUNTER — APPOINTMENT (OUTPATIENT)
Dept: SURGERY | Facility: CLINIC | Age: 70
End: 2020-06-26
Payer: MEDICARE

## 2020-06-26 VITALS
WEIGHT: 159 LBS | HEIGHT: 55 IN | HEART RATE: 98 BPM | SYSTOLIC BLOOD PRESSURE: 149 MMHG | OXYGEN SATURATION: 100 % | DIASTOLIC BLOOD PRESSURE: 90 MMHG | BODY MASS INDEX: 36.8 KG/M2 | TEMPERATURE: 97.9 F

## 2020-06-26 PROCEDURE — 99024 POSTOP FOLLOW-UP VISIT: CPT

## 2020-06-26 NOTE — HISTORY OF PRESENT ILLNESS
[de-identified] : Pt is a 70 y/o F almost 3 weeks s/p vsg who presents today feeling well. States she is having 2 protein shakes at home and states she has had ground meat. Informed pt she needs to wait until week four for this, to focus on blended soups not strained, riced cauliflower, runny eggs. States she has come constipation and is moving bowels 3x/day. Reports a 10 lb wt loss since surgery. Recommended walking 3x weekly for 30 minutes. States she is drinking plenty of water. Denies blood in stool, reflux, pain, n,v.

## 2020-07-10 ENCOUNTER — APPOINTMENT (OUTPATIENT)
Dept: SURGERY | Facility: CLINIC | Age: 70
End: 2020-07-10
Payer: MEDICARE

## 2020-07-10 VITALS
HEART RATE: 66 BPM | SYSTOLIC BLOOD PRESSURE: 126 MMHG | OXYGEN SATURATION: 100 % | TEMPERATURE: 98.2 F | DIASTOLIC BLOOD PRESSURE: 74 MMHG | BODY MASS INDEX: 31.45 KG/M2 | HEIGHT: 59 IN | WEIGHT: 156 LBS

## 2020-07-10 PROCEDURE — 99024 POSTOP FOLLOW-UP VISIT: CPT

## 2020-07-10 NOTE — HISTORY OF PRESENT ILLNESS
[de-identified] : Pt is a 68 y/o F 1 mo s/p VSG who presents today feeling well. Pt down 12 lbs since date of surgery. States that she is walking at home for exercise, encouraged to increase PA. States she is having 2 shakes per day and also is having blended chicken and veggies for meals. Pt to meet with Kandace today to advance diet to solid protein sources and soft veggies. States she is drinking 8 cups of water. Denies pain, n,v,c,d, reflux. Pt is taking fiber supplement and all recommended vitamins.

## 2020-07-14 ENCOUNTER — APPOINTMENT (OUTPATIENT)
Dept: SURGERY | Facility: CLINIC | Age: 70
End: 2020-07-14
Payer: MEDICARE

## 2020-07-14 VITALS
BODY MASS INDEX: 31.35 KG/M2 | TEMPERATURE: 98.3 F | OXYGEN SATURATION: 98 % | DIASTOLIC BLOOD PRESSURE: 85 MMHG | WEIGHT: 155.5 LBS | HEART RATE: 68 BPM | HEIGHT: 59 IN | SYSTOLIC BLOOD PRESSURE: 157 MMHG

## 2020-07-14 PROCEDURE — 99024 POSTOP FOLLOW-UP VISIT: CPT

## 2020-07-14 NOTE — HISTORY OF PRESENT ILLNESS
[de-identified] : Pt is a 68 y/o F 1 mo s/p VSG who presents today feeling well. Pt down 12 lbs since date of surgery. Pt was seen one week ago and was doing well, but wanted to come in due to concern of one of her incisions. Left abdominal incision was opened in office, cleaned, and bandaged. Pt tolerated well.

## 2020-09-11 ENCOUNTER — APPOINTMENT (OUTPATIENT)
Dept: SURGERY | Facility: CLINIC | Age: 70
End: 2020-09-11
Payer: MEDICARE

## 2020-09-11 VITALS
HEART RATE: 61 BPM | SYSTOLIC BLOOD PRESSURE: 161 MMHG | TEMPERATURE: 97 F | HEIGHT: 59 IN | DIASTOLIC BLOOD PRESSURE: 74 MMHG | BODY MASS INDEX: 30.74 KG/M2 | OXYGEN SATURATION: 99 % | WEIGHT: 152.5 LBS

## 2020-09-11 DIAGNOSIS — Z98.84 BARIATRIC SURGERY STATUS: ICD-10-CM

## 2020-09-11 PROCEDURE — 99213 OFFICE O/P EST LOW 20 MIN: CPT

## 2020-09-16 PROBLEM — Z98.84 STATUS POST LAPAROSCOPIC SLEEVE GASTRECTOMY: Status: ACTIVE | Noted: 2020-06-16

## 2020-09-16 NOTE — ASSESSMENT
[FreeTextEntry1] : Pt is a 71 y/o F presents today for follow up 3 months s/p VSG 6/8/20. Pt is doing well overall with 16 lb weight loss, no heartburn, and no other complaints. She is vitamin compliant, exercises 3x a week and endorses regular BM, partially due to Metamucil. She still takes medication for HTN but we discussed how as pt continues to lose weight she may not longer need it. She will meet with nutrition today for dietary counseling. Pt will follow up in 3 months and we will order blood work at that time.

## 2020-09-16 NOTE — ADDENDUM
[FreeTextEntry1] : This note was written by Adrienne Blakc on 08/28/2020 acting as scribe for ELIAS Clark.

## 2020-09-16 NOTE — HISTORY OF PRESENT ILLNESS
[de-identified] : Pt is a 71 y/o F presents today for follow up 3 months s/p VSG 6/8/20. Pt reports doing well and denies heartburn, reflux,n,v,d. Pt has lost ~16 lb since the operation. She is tolerating her diet well, eating vegetables, turkey, fish and chicken. Pt is vitamin compliant. She endorses regular BM because she takes Metamucil. She reports walking for 30 min 3x a week.\par A telephone  service was used during the visit and the 's name was Chiqui.

## 2020-09-16 NOTE — END OF VISIT
[FreeTextEntry3] : All medical record entries made by the Scribe were at my, ELIAS Clark, discretion and personally dictated by me on 08/28/2020. I have reviewed the chart and agree that the record accurately reflects my personal performance of the history, physical exam, assessment and plan. I have also personally directed, reviewed and agreed to the chart.

## 2020-12-11 ENCOUNTER — APPOINTMENT (OUTPATIENT)
Dept: SURGERY | Facility: CLINIC | Age: 70
End: 2020-12-11

## 2022-01-10 ENCOUNTER — NON-APPOINTMENT (OUTPATIENT)
Age: 72
End: 2022-01-10

## 2024-02-07 NOTE — ED ADULT NURSE NOTE - NSFALLRSKASSESASSIST_ED_ALL_ED
RESOLVED. p/w 2 days dyspnea, wheezing, generalized weakness, myalgias, and chest tightness. Required BiPAP for increased WOB, now titrated to 3L NC. CXR with perihilar opacities. Pt reports improving sx with tx received in ED. Likely exacerbation of CHF 2/2 underlying URI. asthma exacerbation and PNA lower on differential  -Goal SpO2 >92%, off supplemental O2  -volume overload and asthma exacerbation management per below no